# Patient Record
Sex: FEMALE | Race: WHITE | Employment: UNEMPLOYED | URBAN - METROPOLITAN AREA
[De-identification: names, ages, dates, MRNs, and addresses within clinical notes are randomized per-mention and may not be internally consistent; named-entity substitution may affect disease eponyms.]

---

## 2019-02-24 ENCOUNTER — APPOINTMENT (OUTPATIENT)
Dept: GENERAL RADIOLOGY | Age: 44
End: 2019-02-24
Payer: MEDICARE

## 2019-02-24 ENCOUNTER — HOSPITAL ENCOUNTER (EMERGENCY)
Age: 44
Discharge: HOME | End: 2019-02-25
Payer: MEDICARE

## 2019-02-24 DIAGNOSIS — T85.528A DISLODGED GASTROSTOMY TUBE: Primary | ICD-10-CM

## 2019-02-24 PROCEDURE — 6360000004 HC RX CONTRAST MEDICATION

## 2019-02-24 PROCEDURE — 99282 EMERGENCY DEPT VISIT SF MDM: CPT

## 2019-02-24 PROCEDURE — 49465 FLUORO EXAM OF G/COLON TUBE: CPT

## 2019-02-24 RX ORDER — ACETAMINOPHEN 500 MG
1000 TABLET ORAL ONCE
Status: COMPLETED | OUTPATIENT
Start: 2019-02-24 | End: 2019-02-25

## 2019-02-24 RX ORDER — SODIUM CHLORIDE 0.9 % (FLUSH) 0.9 %
SYRINGE (ML) INJECTION
Status: DISCONTINUED
Start: 2019-02-24 | End: 2019-02-25 | Stop reason: HOSPADM

## 2019-02-24 RX ADMIN — IOHEXOL 20 ML: 350 INJECTION, SOLUTION INTRAVENOUS at 23:10

## 2019-02-25 VITALS
BODY MASS INDEX: 21.48 KG/M2 | WEIGHT: 110 LBS | TEMPERATURE: 98.8 F | DIASTOLIC BLOOD PRESSURE: 89 MMHG | OXYGEN SATURATION: 99 % | SYSTOLIC BLOOD PRESSURE: 150 MMHG | RESPIRATION RATE: 16 BRPM | HEART RATE: 106 BPM

## 2019-02-25 PROCEDURE — 6370000000 HC RX 637 (ALT 250 FOR IP): Performed by: PHYSICIAN ASSISTANT

## 2019-02-25 RX ADMIN — ACETAMINOPHEN 1000 MG: 500 TABLET, FILM COATED ORAL at 00:06

## 2019-02-25 ASSESSMENT — PAIN SCALES - GENERAL: PAINLEVEL_OUTOF10: 8

## 2019-03-04 ENCOUNTER — HOSPITAL ENCOUNTER (EMERGENCY)
Age: 44
Discharge: HOME | End: 2019-03-05
Payer: MEDICARE

## 2019-03-04 ENCOUNTER — APPOINTMENT (OUTPATIENT)
Dept: GENERAL RADIOLOGY | Age: 44
End: 2019-03-04
Payer: MEDICARE

## 2019-03-04 VITALS
BODY MASS INDEX: 21.6 KG/M2 | TEMPERATURE: 98.1 F | SYSTOLIC BLOOD PRESSURE: 122 MMHG | WEIGHT: 110 LBS | DIASTOLIC BLOOD PRESSURE: 71 MMHG | HEART RATE: 108 BPM | RESPIRATION RATE: 22 BRPM | HEIGHT: 60 IN | OXYGEN SATURATION: 100 %

## 2019-03-04 DIAGNOSIS — K94.23 GASTROSTOMY TUBE DYSFUNCTION (HCC): Primary | ICD-10-CM

## 2019-03-04 PROCEDURE — 6360000004 HC RX CONTRAST MEDICATION

## 2019-03-04 PROCEDURE — 49465 FLUORO EXAM OF G/COLON TUBE: CPT

## 2019-03-04 PROCEDURE — 99283 EMERGENCY DEPT VISIT LOW MDM: CPT

## 2019-03-04 RX ORDER — SODIUM CHLORIDE 0.9 % (FLUSH) 0.9 %
SYRINGE (ML) INJECTION
Status: DISCONTINUED
Start: 2019-03-04 | End: 2019-03-05 | Stop reason: HOSPADM

## 2019-03-04 RX ADMIN — IOHEXOL 25 ML: 350 INJECTION, SOLUTION INTRAVENOUS at 22:33

## 2019-03-29 ENCOUNTER — HOSPITAL ENCOUNTER (EMERGENCY)
Age: 44
Discharge: HOME | End: 2019-03-29
Payer: MEDICARE

## 2019-03-29 VITALS
BODY MASS INDEX: 21.6 KG/M2 | TEMPERATURE: 98.3 F | OXYGEN SATURATION: 93 % | HEART RATE: 120 BPM | WEIGHT: 110 LBS | RESPIRATION RATE: 18 BRPM | DIASTOLIC BLOOD PRESSURE: 76 MMHG | SYSTOLIC BLOOD PRESSURE: 136 MMHG | HEIGHT: 60 IN

## 2019-03-29 DIAGNOSIS — Z43.1 ATTENTION TO G-TUBE (HCC): Primary | ICD-10-CM

## 2019-03-29 PROCEDURE — 99282 EMERGENCY DEPT VISIT SF MDM: CPT

## 2019-03-30 NOTE — ED NOTES
This RN flushed g-tube. g-tube draining and operating appropriately.       Chavo Biswas RN  03/29/19 2050

## 2019-03-30 NOTE — ED PROVIDER NOTES
[address] [facility]  eMERGENCY dEPARTMENT eNCOUnter        Pt Name: Josh Tran  MRN: [id number]  [place] 1975  Date of evaluation: 3/29/2019  Provider: Ethan Evans PA-C  PCP: Luis Carlos Wilburn MD    This patient was not seen and evaluated by the attending physician No att. providers found. CHIEF COMPLAINT       Chief Complaint   Patient presents with    G Tube Complications     sent to have gtube replaced. HISTORY OF PRESENT ILLNESS   (Location/Symptom, Timing/Onset, Context/Setting, Quality, Duration, Modifying Factors, Severity)  Note limiting factors. Josh Tran is a 40 y.o. female patient presents the emergency department to have her G-tube changed. Patient presents to the emergency department with her caregiver. Patient is nonverbal. Patient's caregiver states she is here to have the patient's G-tube changed as the particular style of G-tube that is currently in place irritates the patient. She states that this is a functioning G-tube and it is not currently clogged. The caregiver has a G-tube that she would like placed with her. There are no medical complaints at this time. Patient has not had any fevers or difficulties at home. Nursing Notes were all reviewed and agreed with or any disagreements were addressed  in the HPI. REVIEW OF SYSTEMS    (2-9 systems for level 4, 10 or more for level 5)     Review of Systems   Unable to perform ROS: Patient nonverbal       Positives and Pertinent negatives as per HPI. Except as noted abovein the ROS, all other systems were reviewed and negative.        PAST MEDICAL HISTORY     Past Medical History:   Diagnosis Date    ADHD (attention deficit hyperactivity disorder)     Anxiety     Dysphagia     1984    Feeding by G-tube ([address]     1984    Head injury     1984    Hydrocephalus     S/P SHUNT 1984    Hyperlipidemia     Hypothyroidism     Paraparesis ([address]     1984    [facility]     Seizure disorder ([facility]     Speech 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     46    Vitamin D deficiency          SURGICAL HISTORY     Past Surgical History:   Procedure Laterality Date    CSF SHUNT      GASTROSTOMY TUBE PLACEMENT           CURRENTMEDICATIONS       Previous Medications    ACETAMINOPHEN (TYLENOL) 500 MG TABLET    Take 500 mg by mouth every 6 hours as needed. ALBUTEROL (PROVENTIL HFA) 108 (90 BASE) MCG/ACT INHALER    Inhale 2 puffs into the lungs every 6 hours as needed for Wheezing    AMPHETAMINE-DEXTROAMPHETAMINE (ADDERALL XR) 20 MG XR CAPSULE    Take 1 capsule by mouth every morning    AMPHETAMINE-DEXTROAMPHETAMINE (ADDERALL XR) 5 MG XR CAPSULE    Take 1 capsule by mouth Daily with lunch    IMIQUIMOD (ALDARA) 5 % CREAM    Apply  topically three times a week. Apply topically three times a week. LEVOTHYROXINE (SYNTHROID) 25 MCG TABLET    Take 1 tablet by mouth daily    MENTHOL-ZINC OXIDE (CALMOSEPTINE) 0.44-20.6 % OINT OINTMENT    Apply topically 2 times daily as needed Max 30 ml per day. MOMETASONE (NASONEX) 50 MCG/ACT NASAL SPRAY    2 sprays by Nasal route daily    NUTRITIONAL SUPPLEMENTS (JEVITY 1 ATIF) LIQD    1 can TID    NYSTATIN (MYCOSTATIN) 131084 UNIT/GM CREAM    As directed daily    POLYETHYLENE GLYCOL (GLYCOLAX) POWDER    Take 17 g by mouth daily    TRIAMCINOLONE (ARISTOCORT) 0.5 % CREAM    As directed daily    VALPROIC ACID (DEPAKENE) 250 MG/5ML SYRP    Take 5 mLs by mouth 3 times daily         ALLERGIES     Phenytoin sodium extended    FAMILYHISTORY     History reviewed. No pertinent family history.        SOCIAL HISTORY       Social History     Socioeconomic History    Marital status: Single     Spouse name: None    Number of children: None    Years of education: None    Highest education level: None   Occupational History    None   Social Needs    Financial resource strain: None    Food insecurity:     Worry: None     Inability: None    specialist or surgeon. Patient was alert and appeared nontoxic on examination. Patient was afebrile and vitals are stable throughout her stay. FINAL IMPRESSION      1.  Attention to G-tube [facility])          DISPOSITION/PLAN   DISPOSITION Decision To Discharge 03/29/2019 08:37:20 PM      PATIENT REFERREDTO:  [facility] Emergency Department  [address] [place] [address]  [phone]    If symptoms worsen      DISCHARGE MEDICATIONS:  New Prescriptions    No medications on file       DISCONTINUED MEDICATIONS:  Discontinued Medications    No medications on file              (Please note that portions ofthis note were completed with a voice recognition program.  Efforts were made to edit the dictations but occasionally words are mis-transcribed.)    Yoni Dhaliwal PA-C (electronically signed)            Yoni Dhaliwal PA-C  03/30/19 7484
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
## 2019-05-23 ENCOUNTER — OFFICE VISIT (OUTPATIENT)
Dept: CARDIOLOGY CLINIC | Age: 44
End: 2019-05-23
Payer: MEDICARE

## 2019-05-23 VITALS
BODY MASS INDEX: 21.99 KG/M2 | SYSTOLIC BLOOD PRESSURE: 112 MMHG | HEART RATE: 111 BPM | WEIGHT: 112 LBS | HEIGHT: 60 IN | OXYGEN SATURATION: 98 % | DIASTOLIC BLOOD PRESSURE: 72 MMHG

## 2019-05-23 DIAGNOSIS — R00.0 SINUS TACHYCARDIA: ICD-10-CM

## 2019-05-23 DIAGNOSIS — Z76.89 ENCOUNTER TO ESTABLISH CARE WITH NEW DOCTOR: Primary | ICD-10-CM

## 2019-05-23 PROCEDURE — G8420 CALC BMI NORM PARAMETERS: HCPCS | Performed by: INTERNAL MEDICINE

## 2019-05-23 PROCEDURE — 99202 OFFICE O/P NEW SF 15 MIN: CPT | Performed by: INTERNAL MEDICINE

## 2019-05-23 PROCEDURE — G8427 DOCREV CUR MEDS BY ELIG CLIN: HCPCS | Performed by: INTERNAL MEDICINE

## 2019-05-23 PROCEDURE — 93000 ELECTROCARDIOGRAM COMPLETE: CPT | Performed by: INTERNAL MEDICINE

## 2019-05-23 PROCEDURE — 1036F TOBACCO NON-USER: CPT | Performed by: INTERNAL MEDICINE

## 2019-05-23 RX ORDER — ATENOLOL 25 MG/1
25 TABLET ORAL DAILY
Qty: 90 TABLET | Refills: 3 | Status: SHIPPED | OUTPATIENT
Start: 2019-05-23 | End: 2020-06-01

## 2019-05-23 RX ORDER — LORAZEPAM 0.5 MG/1
0.5 TABLET ORAL EVERY 6 HOURS PRN
Status: ON HOLD | COMMUNITY
End: 2020-08-01

## 2019-06-29 ENCOUNTER — HOSPITAL ENCOUNTER (EMERGENCY)
Age: 44
Discharge: HOME | End: 2019-06-30
Attending: EMERGENCY MEDICINE
Payer: MEDICARE

## 2019-06-29 VITALS
HEART RATE: 98 BPM | WEIGHT: 120 LBS | RESPIRATION RATE: 18 BRPM | DIASTOLIC BLOOD PRESSURE: 68 MMHG | SYSTOLIC BLOOD PRESSURE: 111 MMHG | TEMPERATURE: 98.8 F | BODY MASS INDEX: 23.44 KG/M2 | OXYGEN SATURATION: 94 %

## 2019-06-29 DIAGNOSIS — Z43.1 ATTENTION TO G-TUBE (HCC): Primary | ICD-10-CM

## 2019-06-29 PROCEDURE — 99282 EMERGENCY DEPT VISIT SF MDM: CPT

## 2019-06-29 RX ORDER — LIDOCAINE AND PRILOCAINE 25; 25 MG/G; MG/G
CREAM TOPICAL
Status: DISCONTINUED
Start: 2019-06-29 | End: 2019-06-30 | Stop reason: HOSPADM

## 2019-06-29 RX ORDER — LIDOCAINE HYDROCHLORIDE 20 MG/ML
JELLY TOPICAL
Status: DISCONTINUED
Start: 2019-06-29 | End: 2019-06-30 | Stop reason: HOSPADM

## 2019-06-29 RX ORDER — LIDOCAINE HYDROCHLORIDE 20 MG/ML
JELLY TOPICAL
Status: DISCONTINUED
Start: 2019-06-29 | End: 2019-06-29 | Stop reason: WASHOUT

## 2019-06-30 NOTE — ED NOTES
Pt's G tube site red, hardened skin, no drainage noted. Pt withdraws when tube manipulated. Able to instill 50ml water, unable to withdraw, this is typical per caregiver at bedside. Notified Dr. Stephania Bonds.      Moy Mclain RN  06/30/19 8403

## 2019-06-30 NOTE — ED NOTES
Pt used bedpan, obtained urine specimen, radha care with bath wipes, bed pad changed. Pt removed cycling blood pressure cuff, replaced at this time.       Iraida Carson RN  06/29/19 0551

## 2020-05-29 RX ORDER — ATENOLOL 25 MG/1
TABLET ORAL
Qty: 30 TABLET | Refills: 0 | OUTPATIENT
Start: 2020-05-29

## 2020-06-01 RX ORDER — ATENOLOL 25 MG/1
TABLET ORAL
Qty: 30 TABLET | Refills: 3 | Status: ON HOLD | OUTPATIENT
Start: 2020-06-01 | End: 2020-08-01

## 2020-06-01 NOTE — TELEPHONE ENCOUNTER
Last OV 5/23/19. Next OV not scheduled. Assessment and Plan:     Follow up with PCP     Thank you very much for allowing me to participate in the care of your patient.  Please do not hesitate to contact me if you have any questions.

## 2020-06-17 RX ORDER — ATENOLOL 25 MG/1
TABLET ORAL
Qty: 30 TABLET | Refills: 0 | OUTPATIENT
Start: 2020-06-17

## 2020-07-19 ENCOUNTER — HOSPITAL ENCOUNTER (EMERGENCY)
Age: 45
Discharge: HOME | End: 2020-07-19
Payer: MEDICARE

## 2020-07-19 ENCOUNTER — APPOINTMENT (OUTPATIENT)
Dept: GENERAL RADIOLOGY | Age: 45
End: 2020-07-19
Payer: MEDICARE

## 2020-07-19 VITALS
TEMPERATURE: 98.4 F | SYSTOLIC BLOOD PRESSURE: 115 MMHG | RESPIRATION RATE: 16 BRPM | HEART RATE: 98 BPM | OXYGEN SATURATION: 99 % | BODY MASS INDEX: 23.16 KG/M2 | WEIGHT: 118.6 LBS | DIASTOLIC BLOOD PRESSURE: 85 MMHG

## 2020-07-19 PROCEDURE — 6360000004 HC RX CONTRAST MEDICATION: Performed by: PHYSICIAN ASSISTANT

## 2020-07-19 PROCEDURE — 99282 EMERGENCY DEPT VISIT SF MDM: CPT

## 2020-07-19 PROCEDURE — 74018 RADEX ABDOMEN 1 VIEW: CPT

## 2020-07-19 PROCEDURE — 49450 REPLACE G/C TUBE PERC: CPT

## 2020-07-19 RX ADMIN — DIATRIZOATE MEGLUMINE AND DIATRIZOATE SODIUM 30 ML: 600; 100 SOLUTION ORAL; RECTAL at 19:19

## 2020-07-19 ASSESSMENT — ENCOUNTER SYMPTOMS
VOMITING: 0
ABDOMINAL PAIN: 0

## 2020-07-19 NOTE — ED PROVIDER NOTES
[address]        Pt Name: Yogesh Barros  MRN: [id number]  [place] 1975  Date of evaluation: 7/19/2020  Provider: ROSALINO Gonzales    This patient was not seen and evaluated by the attending physician No att. providers found. CHIEF COMPLAINT       Chief Complaint   Patient presents with    G Tube Complications         HISTORY OF PRESENT ILLNESS  (Location/Symptom, Timing/Onset, Context/Setting, Quality, Duration,Modifying Factors, Severity.)   Yogesh Barros is a 39 y.o. female who presents to the emergency department for G tube dislodgement. Patient's caregiver reports that G tube came out 1 hour ago. Reports when it did, the balloon was still inflated but not with much fluid. Reports she used a syringe to remove the fluid from the balloon and reports there was less fluid in there than normal.  She shows me the syringe she used that still has fluid in it and only 1 mL fluid. Caregiver reports she put the tube back in and taped it in place. She reports patient does not seem to be in pain. Denies vomiting. Did have a little bit of spit up after drinking her boost but that is normal for the patient. Denies any fever. Caregiver reports they don't use the G tube for feedings anymore. It is just flushed daily. Nursing Notes were reviewed and I agree. OF SYSTEMS    (2-9 systems for level 4, 10 or more for level 5)     Review of Systems   Constitutional: Negative for fever. Gastrointestinal: Negative for abdominal pain and vomiting.        +G tube dislodgement      Except as noted above the remainder of the review of systems was reviewed and negative.        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 ALBUTEROL (PROVENTIL HFA) 108 (90 BASE) MCG/ACT INHALER    Inhale 2 puffs into the lungs every 6 hours as needed for Wheezing    AMPHETAMINE-DEXTROAMPHETAMINE (ADDERALL XR) 20 MG XR CAPSULE    Take 1 capsule by mouth every morning    AMPHETAMINE-DEXTROAMPHETAMINE (ADDERALL XR) 5 MG XR CAPSULE    Take 1 capsule by mouth Daily with lunch    ATENOLOL (TENORMIN) 25 MG TABLET    TAKE 1 TABLET BY MOUTH ONCE DAILY. IMIQUIMOD (ALDARA) 5 % CREAM    Apply  topically three times a week. Apply topically three times a week. LEVOTHYROXINE (SYNTHROID) 25 MCG TABLET    Take 1 tablet by mouth daily    LORAZEPAM (ATIVAN) 0.5 MG TABLET    Take 0.5 mg by mouth every 6 hours as needed for Anxiety. MENTHOL-ZINC OXIDE (CALMOSEPTINE) 0.44-20.6 % OINT OINTMENT    Apply topically 2 times daily as needed Max 30 ml per day. MOMETASONE (NASONEX) 50 MCG/ACT NASAL SPRAY    2 sprays by Nasal route daily    NUTRITIONAL SUPPLEMENTS (JEVITY 1 ATIF) LIQD    1 can TID    NYSTATIN (MYCOSTATIN) 309020 UNIT/GM CREAM    As directed daily    POLYETHYLENE GLYCOL (GLYCOLAX) POWDER    Take 17 g by mouth daily    TRIAMCINOLONE (ARISTOCORT) 0.5 % CREAM    As directed daily    VALPROIC ACID (DEPAKENE) 250 MG/5ML SYRP    Take 5 mLs by mouth 3 times daily       ALLERGIES     Phenytoin sodium extended    FAMILY HISTORY     History reviewed. No pertinent family history. No family status information on file. SOCIAL HISTORY      reports that she has never smoked.  She has never used smokeless tobacco. She reports that she does not drink alcohol or use drugs. PHYSICAL EXAM    (up to 7 for level 4, 8 or more for level 5)     ED Triage Vitals [07/19/20 1804]   BP Temp Temp Source Pulse Resp SpO2 Height Weight   (!) 161/83 98.4 °F (36.9 °C) Oral 106 16 98 % -- 118 lb 9.6 oz (53.8 kg)       Physical Exam  Constitutional:       General: She is not in acute distress. Appearance: She is not ill-appearing, toxic-appearing or diaphoretic. HENT:      Head: Normocephalic and atraumatic. Pulmonary:      Effort: Pulmonary effort is normal. No respiratory distress. Abdominal:      General: There is no distension. Palpations: Abdomen is soft. There is no mass. Tenderness: There is no abdominal tenderness. There is no guarding or rebound. Hernia: No hernia is present. Comments: G tube site in LUQ with tube taped in place. Site has minimal surrounding erythema. No blood. Neurological:      Mental Status: She is alert. DIFFERENTIAL DIAGNOSIS   g tube complication, other    DIAGNOSTICRESULTS         RADIOLOGY:   Non-plain film images such as CT, Ultrasound and MRI are read by the radiologist. Plain radiographic images are visualized and preliminarily interpreted by ROSALINO Palmer with the below findings:      Interpretation per the Radiologist below, if available at the time of this note:    XR ABDOMEN (KUB) (SINGLE AP VIEW)   Final Result   Contrast injection demonstrates positioning within the stomach. LABS:  No results found for this visit on 07/19/20. All other labs were within normal range or not returned as of this dictation.     EMERGENCY DEPARTMENT COURSE and DIFFERENTIALDIAGNOSIS/MDM:   Vitals:    Vitals:    07/19/20 1804   BP: (!) 161/83   Pulse: 106   Resp: 16   Temp: 98.4 °F (36.9 °C)   TempSrc: Oral   SpO2: 98%   Weight: 118 lb 9.6 oz (53.8 kg)       Patient wasnontoxic, well appearing, afebrile with normal vital signs with the exception of HTN 161/83 and tachycardia 106. Saturating well on room air. G tube was placed. See note below. Confirmed with xray. Upon reeval, she clinically appears well. Instructed caregiver to FU with PCP and return for worsening. She agreed and understood. PROCEDURES:  Feeding Tube    Date/Time: 7/19/2020 6:46 PM  Performed by: ROSALINO Bloom  Authorized by: ROSALINO Bloom     Consent:     Consent obtained:  Verbal    Consent given by: caregiver. Pre-procedure details: Old tube type:  Gastrostomy    Old tube size:  18 Fr  Anesthesia (see MAR for exact dosages): Anesthesia method:  None  Procedure details:     Patient position:  Supine    Procedure type:  Replacement    Tube type:  Gastrostomy    Tube size:  18 Fr    Bulb inflation volume:  6 mL    Bulb inflation fluid:  Normal saline  Post-procedure details:     Placement/position confirmation:  Gastric contents aspirated and x-ray    Placement difficulty:  None    Bleeding:  None    Patient tolerance of procedure: Tolerated well, no immediate complications          FINAL IMPRESSION      1.  Complication of gastrostomy tube ([address]    2. Elevated blood pressure reading        DISPOSITION/PLAN   DISPOSITION Discharge - Pending Orders Complete 07/19/2020 07:48:45 PM      PATIENT REFERRED TO:  Jasmeet Ya, [address] [facility]  [phone]    Schedule an appointment as soon as possible for a visit   for reevaluation and to get blood pressure rechecked    [facility] Emergency Department  [address] [facility]  [phone]    As needed, If symptoms worsen      DISCHARGE MEDICATIONS:  New Prescriptions    No medications on file       (Please note that portions ofthis note were completed with a voice recognition program.  Efforts were made to edit the dictations but occasionally words are mis-transcribed.)    Sara Herrera, [address] [place] [address]  07/19/20

## 2020-07-21 ENCOUNTER — APPOINTMENT (OUTPATIENT)
Dept: GENERAL RADIOLOGY | Age: 45
End: 2020-07-21
Payer: MEDICARE

## 2020-07-21 ENCOUNTER — HOSPITAL ENCOUNTER (EMERGENCY)
Age: 45
Discharge: HOME | End: 2020-07-21
Payer: MEDICARE

## 2020-07-21 VITALS
TEMPERATURE: 97.7 F | DIASTOLIC BLOOD PRESSURE: 72 MMHG | SYSTOLIC BLOOD PRESSURE: 106 MMHG | RESPIRATION RATE: 16 BRPM | HEART RATE: 88 BPM | OXYGEN SATURATION: 96 %

## 2020-07-21 PROCEDURE — 74018 RADEX ABDOMEN 1 VIEW: CPT

## 2020-07-21 PROCEDURE — 6360000004 HC RX CONTRAST MEDICATION: Performed by: PHYSICIAN ASSISTANT

## 2020-07-21 PROCEDURE — 99282 EMERGENCY DEPT VISIT SF MDM: CPT

## 2020-07-21 RX ADMIN — DIATRIZOATE MEGLUMINE AND DIATRIZOATE SODIUM 20 ML: 600; 100 SOLUTION ORAL; RECTAL at 16:28

## 2020-07-21 ASSESSMENT — ENCOUNTER SYMPTOMS
VOMITING: 0
COLOR CHANGE: 1

## 2020-07-21 NOTE — ED NOTES
Patient presents to the ED via Wheelchair for leaking around G Tube site.   Patient presents with caregiver, patient follows basic commands, but is unable to answer questions verbally (will shake head appropriatly)       Shahida Ferreira RN  07/21/20 9582

## 2020-07-21 NOTE — ED NOTES
Bed: C-20  Expected date:   Expected time:   Means of arrival:   Comments:  Nina Ames RN  07/21/20 3088

## 2020-07-21 NOTE — ED PROVIDER NOTES
[address] [facility]      Pt Name: Garrett Rebolledo  MRN: [id number]  [place] 1975  Date of evaluation: 7/21/2020  Provider: ROSALINO Penaloza    This patient was not seen and evaluated by the attending physician No att. providers found. CHIEF COMPLAINT       Chief Complaint   Patient presents with    G Tube Complications     was replaced on Sunday night, now leaking around site per caregiver        CRITICAL CARE TIME   I performed a total Critical Care time of  15 minutes, excluding separately reportable procedures. There was a high probability of clinically significant/life threatening deterioration in the patient's condition which required my urgent intervention. Not limited to multiple reexaminations, discussions with attending physician and consultants. HISTORY OF PRESENT ILLNESS  (Location/Symptom, Timing/Onset, Context/Setting, Quality, Duration, Modifying Factors, Severity.)   Garrett Rebolledo is a 39 y.o. female who presents to the emergency department accompanied by a caretaker. Caretaker states that the patient has had a G-tube in place for many years. She states that she confirmed with the supervisor that the tube has not been used in over a year. She takes everything by mouth including medications and food and the caretakers \"flush it once a day. \"  The tube accidentally got pulled out 2 days ago and was replaced in the emergency department. They noticed today that there was some liquid leaking around the tube which prompted her to come back. No vomiting no fevers. She does not currently follow with a GI doctor per the caretaker. Nursing Notes were reviewed and I agree. REVIEW OF SYSTEMS    (2-9 systems for level 4, 10 or more for level 5)     Review of Systems   Constitutional: Negative for fever. Gastrointestinal: Negative for vomiting. Skin: Positive for color change (redness around g tube).  Negative for rash and wound.   Psychiatric/Behavioral: Positive for confusion (baseline mental status). PAST MEDICAL HISTORY         Diagnosis Date    ADHD (attention deficit hyperactivity disorder)     Anxiety     Dysphagia     1984    Feeding by G-tube ([facility]     1984    Head injury     1984    Hydrocephalus ([facility]     S/P SHUNT 1984    Hyperlipidemia     Hypothyroidism     Paraparesis ([facility]     1984    Seizure disorder ([facility]     Seizure disorder ([facility]     Speech 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     46    Vitamin D deficiency        SURGICAL HISTORY           Procedure Laterality Date    CSF SHUNT      GASTROSTOMY TUBE PLACEMENT         CURRENT MEDICATIONS       Discharge Medication List as of 7/21/2020  4:57 PM      CONTINUE these medications which have NOT CHANGED    Details   atenolol (TENORMIN) 25 MG tablet TAKE 1 TABLET BY MOUTH ONCE DAILY. , Disp-30 tablet, R-3FOR COMPLIANCE PACKAGING Patient to follow PCP. Please send future refills to PCP. Thank you. Normal      LORazepam (ATIVAN) 0.5 MG tablet Take 0.5 mg by mouth every 6 hours as needed for Anxiety. Historical Med      valproic acid (DEPAKENE) 250 MG/5ML SYRP Take 5 mLs by mouth 3 times daily, Disp-1350 mL, R-0**Patient requests 90 days supply**Normal      levothyroxine (SYNTHROID) 25 MCG tablet Take 1 tablet by mouth daily, Disp-90 tablet, R-0**Patient requests 90 days supply**Normal      menthol-zinc oxide (CALMOSEPTINE) 0.44-20.6 % OINT ointment Apply topically 2 times daily as needed Max 30 ml per day., Topical, 2 TIMES DAILY PRN Starting Tue 10/6/2015, Disp-71 g, R-0, Normal      albuterol (PROVENTIL HFA) 108 (90 BASE) MCG/ACT inhaler Inhale 2 puffs into the lungs every 6 hours as needed for Wheezing, Disp-1 Inhaler, R-1Normal      amphetamine-dextroamphetamine (ADDERALL XR) 5 MG XR capsule Take 1 capsule by mouth Daily with lunch, Disp-30 capsule, R-0NO PRINT      amphetamine-dextroamphetamine (ADDERALL XR) 20 MG XR capsule Take 1 capsule by mouth every morning, Disp-30 capsule, R-0NO PRINT      mometasone (NASONEX) 50 MCG/ACT nasal spray 2 sprays by Nasal route daily, Nasal, DAILY Starting Mon 7/6/2015, Disp-1 Inhaler, R-3, Normal      polyethylene glycol (GLYCOLAX) powder Take 17 g by mouth daily, Disp-1 Bottle, R-2Normal      triamcinolone (ARISTOCORT) 0.5 % cream As directed daily, Disp-1 Tube, R-0, Normal      Nutritional Supplements (JEVITY 1 ATIF) LIQD 1 can TID, Disp-90 Can, R-3Normal      nystatin (MYCOSTATIN) 024023 UNIT/GM cream As directed daily, Disp-1 Tube, R-1, Normal      acetaminophen (TYLENOL) 500 MG tablet Take 500 mg by mouth every 6 hours as needed. imiquimod (ALDARA) 5 % cream Apply  topically three times a week. Apply topically three times a week., Topical, Historical Med             ALLERGIES     Phenytoin sodium extended    FAMILY HISTORY     History reviewed. No pertinent family history. No family status information on file. SOCIAL HISTORY      reports that she has never smoked. She has never used smokeless tobacco. She reports that she does not drink alcohol or use drugs. PHYSICAL EXAM    (up to 7 for level 4, 8 or more for level 5)     ED Triage Vitals   BP Temp Temp src Pulse Resp SpO2 Height Weight   -- -- -- -- -- -- -- --     Physical Exam  Vitals signs and nursing note reviewed. Constitutional:       Appearance: Normal appearance. HENT:      Head: Normocephalic and atraumatic. Eyes:      Pupils: Pupils are equal, round, and reactive to light. Neck:      Musculoskeletal: Normal range of motion. Cardiovascular:      Rate and Rhythm: Normal rate. Pulmonary:      Effort: Pulmonary effort is normal. No respiratory distress. Abdominal:      Tenderness: There is no abdominal tenderness. There is no guarding. Musculoskeletal: Normal range of motion. Skin:     General: Skin is warm. Neurological:      Mental Status: She is alert.    Psychiatric:         Speech: She is noncommunicative. Cognition and Memory: Cognition is impaired. Memory is impaired. DIAGNOSTIC RESULTS     RADIOLOGY:   Non-plain film images such as CT, Ultrasound and MRI are read by the radiologist. Plain radiographic images are visualized and preliminarily interpreted by ROSALINO Parham with the below findings:    Reviewed radiologist's interpretation. Interpretation per the Radiologist below, if available at the time of this note:    XR ABDOMEN (KUB) (SINGLE AP VIEW)   Final Result   Gastrostomy tube injection. Contrast within the stomach. No definite   extravasation of contrast.               LABS:  Labs Reviewed - No data to display    All other labs were within normal range or not returned as of this dictation. EMERGENCY DEPARTMENT COURSE and DIFFERENTIAL DIAGNOSIS/MDM:   Vitals: There were no vitals filed for this visit. Patient is resting comfortably in the bed. She has no abdominal tenderness on exam.  She has a G-tube in place that flushes easily and is confirmed placement with x-ray. After discussing with caretaker and supervisor the patient's tube has not been used in a year. I advised to follow-up with GI to discuss risk-benefit in keeping the tube in place. The tube had a dressing placed around it to prevent irritation from reflux stomach contents around the tube. I estimate there is LOW risk for ACUTE APPENDICITIS, BOWEL OBSTRUCTION, CHOLECYSTITIS, DIVERTICULITIS, INCARCERATED HERNIA, PANCREATITIS, PERFORATED BOWEL, BOWEL ISCHEMIA, GONADAL TORSION, OR CARDIAC ISCHEMIA, thus I consider the discharge disposition reasonable. Also, there is no evidence or peritonitis, sepsis, or toxicity. CONSULTS:  None    PROCEDURES:  Procedures      FINAL IMPRESSION      1.  Gastrostomy tube in place [facility])          DISPOSITION/PLAN   DISPOSITION Decision To Discharge 07/21/2020 04:41:49 PM      PATIENT REFERRED TO:  Eleanor Elils MD  [address] [place] [address]  [phone]    Call in 1 day  For follow up with a GI Dr. Arbutus Fleischer:  Discharge Medication List as of 7/21/2020  4:57 PM          (Please note that portions of this note were completed with a voice recognition program.  Efforts were made to edit the dictations but occasionally words are mis-transcribed.)    Jose Robert [place]  07/21/20 6408

## 2020-07-21 NOTE — ED NOTES
Dressing applied to G Tube site LUQ abdomen, patient tolerated well. Site pink and dry.      Cris Block RN  07/21/20 1189

## 2020-07-31 ENCOUNTER — APPOINTMENT (OUTPATIENT)
Dept: CT IMAGING | Age: 45
DRG: 603 | End: 2020-07-31
Payer: MEDICARE

## 2020-07-31 ENCOUNTER — HOSPITAL ENCOUNTER (INPATIENT)
Age: 45
LOS: 3 days | Discharge: NURSING, REHAB OR LONG-TERM CARE | DRG: 603 | End: 2020-08-05
Attending: EMERGENCY MEDICINE | Admitting: INTERNAL MEDICINE
Payer: MEDICARE

## 2020-07-31 ENCOUNTER — APPOINTMENT (OUTPATIENT)
Dept: GENERAL RADIOLOGY | Age: 45
DRG: 603 | End: 2020-07-31
Payer: MEDICARE

## 2020-07-31 LAB
A/G RATIO: 1.3 (ref 1.1–2.2)
ALBUMIN SERPL-MCNC: 4.3 G/DL (ref 3.4–5)
ALP BLD-CCNC: 44 U/L (ref 40–129)
ALT SERPL-CCNC: 28 U/L (ref 10–40)
ANION GAP SERPL CALCULATED.3IONS-SCNC: 15 MMOL/L (ref 3–16)
AST SERPL-CCNC: 47 U/L (ref 15–37)
BASOPHILS ABSOLUTE: 0.1 K/UL (ref 0–0.2)
BASOPHILS RELATIVE PERCENT: 0.7 %
BILIRUB SERPL-MCNC: 0.3 MG/DL (ref 0–1)
BUN BLDV-MCNC: 27 MG/DL (ref 7–20)
CALCIUM SERPL-MCNC: 9.3 MG/DL (ref 8.3–10.6)
CHLORIDE BLD-SCNC: 105 MMOL/L (ref 99–110)
CO2: 20 MMOL/L (ref 21–32)
CREAT SERPL-MCNC: 1.2 MG/DL (ref 0.6–1.1)
EOSINOPHILS ABSOLUTE: 0 K/UL (ref 0–0.6)
EOSINOPHILS RELATIVE PERCENT: 0.3 %
GFR AFRICAN AMERICAN: 59
GFR NON-AFRICAN AMERICAN: 48
GLOBULIN: 3.2 G/DL
GLUCOSE BLD-MCNC: 86 MG/DL (ref 70–99)
HCT VFR BLD CALC: 41.6 % (ref 36–48)
HEMOGLOBIN: 14.2 G/DL (ref 12–16)
LACTIC ACID: 1.3 MMOL/L (ref 0.4–2)
LYMPHOCYTES ABSOLUTE: 2.2 K/UL (ref 1–5.1)
LYMPHOCYTES RELATIVE PERCENT: 28.7 %
MCH RBC QN AUTO: 31.5 PG (ref 26–34)
MCHC RBC AUTO-ENTMCNC: 34.2 G/DL (ref 31–36)
MCV RBC AUTO: 92.1 FL (ref 80–100)
MONOCYTES ABSOLUTE: 0.5 K/UL (ref 0–1.3)
MONOCYTES RELATIVE PERCENT: 6.8 %
NEUTROPHILS ABSOLUTE: 5 K/UL (ref 1.7–7.7)
NEUTROPHILS RELATIVE PERCENT: 63.5 %
PDW BLD-RTO: 13.3 % (ref 12.4–15.4)
PLATELET # BLD: 243 K/UL (ref 135–450)
PMV BLD AUTO: 9.6 FL (ref 5–10.5)
RBC # BLD: 4.52 M/UL (ref 4–5.2)
SODIUM BLD-SCNC: 140 MMOL/L (ref 136–145)
TOTAL PROTEIN: 7.5 G/DL (ref 6.4–8.2)
WBC # BLD: 7.8 K/UL (ref 4–11)

## 2020-07-31 PROCEDURE — 71045 X-RAY EXAM CHEST 1 VIEW: CPT

## 2020-07-31 PROCEDURE — 36415 COLL VENOUS BLD VENIPUNCTURE: CPT

## 2020-07-31 PROCEDURE — 85025 COMPLETE CBC W/AUTO DIFF WBC: CPT

## 2020-07-31 PROCEDURE — 6360000002 HC RX W HCPCS: Performed by: PHYSICIAN ASSISTANT

## 2020-07-31 PROCEDURE — 99285 EMERGENCY DEPT VISIT HI MDM: CPT

## 2020-07-31 PROCEDURE — 6360000004 HC RX CONTRAST MEDICATION: Performed by: PHYSICIAN ASSISTANT

## 2020-07-31 PROCEDURE — 80053 COMPREHEN METABOLIC PANEL: CPT

## 2020-07-31 PROCEDURE — 74177 CT ABD & PELVIS W/CONTRAST: CPT

## 2020-07-31 PROCEDURE — 83605 ASSAY OF LACTIC ACID: CPT

## 2020-07-31 PROCEDURE — 96375 TX/PRO/DX INJ NEW DRUG ADDON: CPT

## 2020-07-31 PROCEDURE — 96374 THER/PROPH/DIAG INJ IV PUSH: CPT

## 2020-07-31 RX ORDER — MORPHINE SULFATE 4 MG/ML
4 INJECTION, SOLUTION INTRAMUSCULAR; INTRAVENOUS ONCE
Status: COMPLETED | OUTPATIENT
Start: 2020-07-31 | End: 2020-07-31

## 2020-07-31 RX ORDER — ONDANSETRON 2 MG/ML
4 INJECTION INTRAMUSCULAR; INTRAVENOUS ONCE
Status: COMPLETED | OUTPATIENT
Start: 2020-07-31 | End: 2020-07-31

## 2020-07-31 RX ADMIN — MORPHINE SULFATE 4 MG: 4 INJECTION INTRAVENOUS at 21:25

## 2020-07-31 RX ADMIN — IOPAMIDOL 75 ML: 755 INJECTION, SOLUTION INTRAVENOUS at 23:07

## 2020-07-31 RX ADMIN — ONDANSETRON 4 MG: 2 INJECTION INTRAMUSCULAR; INTRAVENOUS at 21:26

## 2020-07-31 ASSESSMENT — ENCOUNTER SYMPTOMS
NAUSEA: 0
CHOKING: 0
VOMITING: 0
APNEA: 0
SHORTNESS OF BREATH: 0
ABDOMINAL PAIN: 1
BACK PAIN: 0
FACIAL SWELLING: 0
EYE DISCHARGE: 0
SORE THROAT: 0
EYE REDNESS: 0

## 2020-07-31 ASSESSMENT — PAIN DESCRIPTION - ORIENTATION
ORIENTATION: LEFT
ORIENTATION: LEFT;LOWER

## 2020-07-31 ASSESSMENT — PAIN SCALES - GENERAL
PAINLEVEL_OUTOF10: 9
PAINLEVEL_OUTOF10: 0

## 2020-07-31 ASSESSMENT — PAIN DESCRIPTION - DESCRIPTORS: DESCRIPTORS: BURNING

## 2020-07-31 ASSESSMENT — PAIN DESCRIPTION - LOCATION
LOCATION: ABDOMEN
LOCATION: ABDOMEN

## 2020-07-31 ASSESSMENT — PAIN DESCRIPTION - PAIN TYPE: TYPE: ACUTE PAIN

## 2020-08-01 PROBLEM — N17.9 AKI (ACUTE KIDNEY INJURY) (HCC): Status: ACTIVE | Noted: 2020-08-01

## 2020-08-01 PROBLEM — L03.311 CELLULITIS, ABDOMINAL WALL: Status: ACTIVE | Noted: 2020-08-01

## 2020-08-01 PROBLEM — L03.311 ABDOMINAL WALL CELLULITIS: Status: ACTIVE | Noted: 2020-08-01

## 2020-08-01 LAB
ANION GAP SERPL CALCULATED.3IONS-SCNC: 12 MMOL/L (ref 3–16)
BUN BLDV-MCNC: 23 MG/DL (ref 7–20)
CALCIUM SERPL-MCNC: 8.6 MG/DL (ref 8.3–10.6)
CHLORIDE BLD-SCNC: 107 MMOL/L (ref 99–110)
CO2: 21 MMOL/L (ref 21–32)
CREAT SERPL-MCNC: 1.2 MG/DL (ref 0.6–1.1)
GFR AFRICAN AMERICAN: 59
GFR NON-AFRICAN AMERICAN: 48
GLUCOSE BLD-MCNC: 86 MG/DL (ref 70–99)
POTASSIUM SERPL-SCNC: 4.3 MMOL/L (ref 3.5–5.1)
SODIUM BLD-SCNC: 140 MMOL/L (ref 136–145)

## 2020-08-01 PROCEDURE — 6360000002 HC RX W HCPCS: Performed by: EMERGENCY MEDICINE

## 2020-08-01 PROCEDURE — 6370000000 HC RX 637 (ALT 250 FOR IP): Performed by: INTERNAL MEDICINE

## 2020-08-01 PROCEDURE — 6360000002 HC RX W HCPCS: Performed by: INTERNAL MEDICINE

## 2020-08-01 PROCEDURE — G0378 HOSPITAL OBSERVATION PER HR: HCPCS

## 2020-08-01 PROCEDURE — 2580000003 HC RX 258: Performed by: PHYSICIAN ASSISTANT

## 2020-08-01 PROCEDURE — 2580000003 HC RX 258: Performed by: INTERNAL MEDICINE

## 2020-08-01 PROCEDURE — 2580000003 HC RX 258: Performed by: STUDENT IN AN ORGANIZED HEALTH CARE EDUCATION/TRAINING PROGRAM

## 2020-08-01 PROCEDURE — 96366 THER/PROPH/DIAG IV INF ADDON: CPT

## 2020-08-01 PROCEDURE — 94761 N-INVAS EAR/PLS OXIMETRY MLT: CPT

## 2020-08-01 PROCEDURE — 96367 TX/PROPH/DG ADDL SEQ IV INF: CPT

## 2020-08-01 PROCEDURE — 36415 COLL VENOUS BLD VENIPUNCTURE: CPT

## 2020-08-01 PROCEDURE — 96376 TX/PRO/DX INJ SAME DRUG ADON: CPT

## 2020-08-01 PROCEDURE — 80048 BASIC METABOLIC PNL TOTAL CA: CPT

## 2020-08-01 PROCEDURE — 2580000003 HC RX 258: Performed by: EMERGENCY MEDICINE

## 2020-08-01 PROCEDURE — 96365 THER/PROPH/DIAG IV INF INIT: CPT

## 2020-08-01 RX ORDER — DEXTROAMPHETAMINE SACCHARATE, AMPHETAMINE ASPARTATE MONOHYDRATE, DEXTROAMPHETAMINE SULFATE AND AMPHETAMINE SULFATE 2.5; 2.5; 2.5; 2.5 MG/1; MG/1; MG/1; MG/1
20 CAPSULE, EXTENDED RELEASE ORAL EVERY MORNING
Status: DISCONTINUED | OUTPATIENT
Start: 2020-08-02 | End: 2020-08-05 | Stop reason: HOSPADM

## 2020-08-01 RX ORDER — VALPROIC ACID 250 MG/5ML
250 SOLUTION ORAL 3 TIMES DAILY
Status: DISCONTINUED | OUTPATIENT
Start: 2020-08-01 | End: 2020-08-05 | Stop reason: HOSPADM

## 2020-08-01 RX ORDER — CETIRIZINE HYDROCHLORIDE 10 MG/1
5 TABLET ORAL DAILY
Status: DISCONTINUED | OUTPATIENT
Start: 2020-08-01 | End: 2020-08-05 | Stop reason: HOSPADM

## 2020-08-01 RX ORDER — TRAZODONE HYDROCHLORIDE 50 MG/1
50 TABLET ORAL NIGHTLY
Status: DISCONTINUED | OUTPATIENT
Start: 2020-08-01 | End: 2020-08-05 | Stop reason: HOSPADM

## 2020-08-01 RX ORDER — SODIUM CHLORIDE 0.9 % (FLUSH) 0.9 %
10 SYRINGE (ML) INJECTION PRN
Status: DISCONTINUED | OUTPATIENT
Start: 2020-08-01 | End: 2020-08-05 | Stop reason: HOSPADM

## 2020-08-01 RX ORDER — LEVOTHYROXINE SODIUM 0.03 MG/1
25 TABLET ORAL DAILY
Status: DISCONTINUED | OUTPATIENT
Start: 2020-08-01 | End: 2020-08-05 | Stop reason: HOSPADM

## 2020-08-01 RX ORDER — MEDROXYPROGESTERONE ACETATE 150 MG/ML
150 INJECTION, SUSPENSION INTRAMUSCULAR
Status: ON HOLD | COMMUNITY
End: 2020-08-01

## 2020-08-01 RX ORDER — SODIUM CHLORIDE 0.9 % (FLUSH) 0.9 %
10 SYRINGE (ML) INJECTION EVERY 12 HOURS SCHEDULED
Status: DISCONTINUED | OUTPATIENT
Start: 2020-08-01 | End: 2020-08-05 | Stop reason: HOSPADM

## 2020-08-01 RX ORDER — ONDANSETRON 2 MG/ML
4 INJECTION INTRAMUSCULAR; INTRAVENOUS EVERY 6 HOURS PRN
Status: DISCONTINUED | OUTPATIENT
Start: 2020-08-01 | End: 2020-08-05 | Stop reason: HOSPADM

## 2020-08-01 RX ORDER — 0.9 % SODIUM CHLORIDE 0.9 %
1000 INTRAVENOUS SOLUTION INTRAVENOUS ONCE
Status: COMPLETED | OUTPATIENT
Start: 2020-08-01 | End: 2020-08-01

## 2020-08-01 RX ORDER — SULFAMETHOXAZOLE AND TRIMETHOPRIM 800; 160 MG/1; MG/1
1 TABLET ORAL 2 TIMES DAILY
Status: ON HOLD | COMMUNITY
Start: 2020-07-28 | End: 2020-08-01

## 2020-08-01 RX ORDER — SODIUM CHLORIDE 9 MG/ML
INJECTION, SOLUTION INTRAVENOUS CONTINUOUS
Status: DISCONTINUED | OUTPATIENT
Start: 2020-08-01 | End: 2020-08-05 | Stop reason: HOSPADM

## 2020-08-01 RX ORDER — TRAZODONE HYDROCHLORIDE 50 MG/1
50 TABLET ORAL NIGHTLY
COMMUNITY

## 2020-08-01 RX ORDER — MORPHINE SULFATE 2 MG/ML
1 INJECTION, SOLUTION INTRAMUSCULAR; INTRAVENOUS EVERY 4 HOURS PRN
Status: DISCONTINUED | OUTPATIENT
Start: 2020-08-01 | End: 2020-08-05 | Stop reason: HOSPADM

## 2020-08-01 RX ORDER — LORATADINE 10 MG/1
10 TABLET ORAL DAILY
COMMUNITY

## 2020-08-01 RX ORDER — NYSTATIN 100000 U/G
CREAM TOPICAL 2 TIMES DAILY
Status: DISCONTINUED | OUTPATIENT
Start: 2020-08-01 | End: 2020-08-05 | Stop reason: HOSPADM

## 2020-08-01 RX ORDER — DEXTROAMPHETAMINE SACCHARATE, AMPHETAMINE ASPARTATE MONOHYDRATE, DEXTROAMPHETAMINE SULFATE AND AMPHETAMINE SULFATE 1.25; 1.25; 1.25; 1.25 MG/1; MG/1; MG/1; MG/1
5 CAPSULE, EXTENDED RELEASE ORAL
Status: DISCONTINUED | OUTPATIENT
Start: 2020-08-01 | End: 2020-08-01

## 2020-08-01 RX ORDER — POLYETHYLENE GLYCOL 3350 17 G/17G
17 POWDER, FOR SOLUTION ORAL DAILY
Status: DISCONTINUED | OUTPATIENT
Start: 2020-08-02 | End: 2020-08-05 | Stop reason: HOSPADM

## 2020-08-01 RX ORDER — POLYETHYLENE GLYCOL 3350 17 G/17G
17 POWDER, FOR SOLUTION ORAL DAILY
Status: DISCONTINUED | OUTPATIENT
Start: 2020-08-01 | End: 2020-08-01

## 2020-08-01 RX ADMIN — MORPHINE SULFATE 1 MG: 2 INJECTION, SOLUTION INTRAMUSCULAR; INTRAVENOUS at 13:05

## 2020-08-01 RX ADMIN — AMPICILLIN SODIUM AND SULBACTAM SODIUM 1.5 G: 1; .5 INJECTION, POWDER, FOR SOLUTION INTRAMUSCULAR; INTRAVENOUS at 20:21

## 2020-08-01 RX ADMIN — POLYETHYLENE GLYCOL 3350 17 G: 17 POWDER, FOR SOLUTION ORAL at 17:27

## 2020-08-01 RX ADMIN — SODIUM CHLORIDE 3 G: 900 INJECTION INTRAVENOUS at 05:04

## 2020-08-01 RX ADMIN — SODIUM CHLORIDE 1000 ML: 9 INJECTION, SOLUTION INTRAVENOUS at 01:07

## 2020-08-01 RX ADMIN — MORPHINE SULFATE 1 MG: 2 INJECTION, SOLUTION INTRAMUSCULAR; INTRAVENOUS at 17:13

## 2020-08-01 RX ADMIN — CETIRIZINE HYDROCHLORIDE 5 MG: 10 TABLET, FILM COATED ORAL at 17:13

## 2020-08-01 RX ADMIN — VALPROIC ACID 250 MG: 250 SOLUTION ORAL at 20:21

## 2020-08-01 RX ADMIN — Medication 10 ML: at 20:25

## 2020-08-01 RX ADMIN — VALPROIC ACID 250 MG: 250 SOLUTION ORAL at 17:13

## 2020-08-01 RX ADMIN — VANCOMYCIN HYDROCHLORIDE 750 MG: 750 INJECTION, POWDER, LYOPHILIZED, FOR SOLUTION INTRAVENOUS at 15:42

## 2020-08-01 RX ADMIN — SODIUM CHLORIDE: 9 INJECTION, SOLUTION INTRAVENOUS at 17:30

## 2020-08-01 RX ADMIN — MORPHINE SULFATE 1 MG: 2 INJECTION, SOLUTION INTRAMUSCULAR; INTRAVENOUS at 21:01

## 2020-08-01 RX ADMIN — LEVOTHYROXINE SODIUM 25 MCG: 0.03 TABLET ORAL at 17:13

## 2020-08-01 RX ADMIN — AMPICILLIN SODIUM AND SULBACTAM SODIUM 1.5 G: 1; .5 INJECTION, POWDER, FOR SOLUTION INTRAMUSCULAR; INTRAVENOUS at 14:53

## 2020-08-01 RX ADMIN — TRAZODONE HYDROCHLORIDE 50 MG: 50 TABLET ORAL at 20:21

## 2020-08-01 ASSESSMENT — PAIN SCALES - GENERAL
PAINLEVEL_OUTOF10: 4
PAINLEVEL_OUTOF10: 0
PAINLEVEL_OUTOF10: 0
PAINLEVEL_OUTOF10: 3
PAINLEVEL_OUTOF10: 5
PAINLEVEL_OUTOF10: 4
PAINLEVEL_OUTOF10: 7
PAINLEVEL_OUTOF10: 0

## 2020-08-01 ASSESSMENT — PAIN SCALES - WONG BAKER: WONGBAKER_NUMERICALRESPONSE: 0

## 2020-08-01 NOTE — ED PROVIDER NOTES
**EVALUATED BY ADVANCED PRACTICE PROVIDER**        [facility] MED SUR[facility]  EMERGENCY DEPARTMENT ENCOUNTER      Pt Name: Sharifa Moreno  HQW:[id number]  [place] 1975  Date of evaluation: 7/31/2020  Provider: Nirmala Reyes PA-C      Chief Complaint:    Chief Complaint   Patient presents with    Other     J tube taken out Tuesday - bleeding from site unable to stop        Nursing Notes, Past Medical Hx, Past Surgical Hx, Social Hx, Allergies, and Family Hx were all reviewed and agreed with or any disagreements were addressed in the HPI.    HPI:  (Location, Duration, Timing, Severity, Quality, Assoc Sx, Context, Modifying factors)  This is a  39 y.o. female complaint of abdominal pain. Patient recently had a J-tube removed. Area around the J-tube skin now appears to be red and very painful. Abdomen painful. Denies nausea vomiting. Denies passing black tarry stools. No diarrhea or constipation. Denies chest pain, no fever. Patient appears to be very uncomfortable. No back pain. No other complaints.       PastMedical/Surgical History:      Diagnosis Date    ADHD (attention deficit hyperactivity disorder)     MARIA TERESA (acute kidney injury) ([facility] 8/1/2020    Anxiety     Dysphagia     1984    Feeding by G-tube ([facility]     1984    Head injury     1984    Hydrocephalus ([facility]     S/P SHUNT 1984    Hyperlipidemia     Hypothyroidism     Paraparesis ([facility]     1984    Seizure disorder ([facility]     Seizure disorder ([facility]     Speech 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     46    Vitamin D deficiency          Procedure Laterality Date    CSF SHUNT      GASTROSTOMY TUBE PLACEMENT         Medications:  Current Discharge Medication List      CONTINUE these medications which have NOT CHANGED    Details   loratadine (CLARITIN) 10 MG tablet Take 10 mg by mouth daily      traZODone (DESYREL) 50 MG tablet Take 50 mg by mouth nightly      sulfamethoxazole-trimethoprim (BACTRIM DS;SEPTRA DS) 800-160 MG per tablet Take 1 tablet by mouth 2 times daily      medroxyPROGESTERone (DEPO-PROVERA) 150 MG/ML injection Inject 150 mg into the muscle every 3 months      atenolol (TENORMIN) 25 MG tablet TAKE 1 TABLET BY MOUTH ONCE DAILY. Qty: 30 tablet, Refills: 3    Comments: FOR COMPLIANCE PACKAGING  Patient to follow PCP. Please send future refills to PCP. Thank you. valproic acid (DEPAKENE) 250 MG/5ML SYRP Take 5 mLs by mouth 3 times daily  Qty: 1350 mL, Refills: 0    Comments: **Patient requests 90 days supply**      levothyroxine (SYNTHROID) 25 MCG tablet Take 1 tablet by mouth daily  Qty: 90 tablet, Refills: 0    Comments: **Patient requests 90 days supply**      menthol-zinc oxide (CALMOSEPTINE) 0.44-20.6 % OINT ointment Apply topically 2 times daily as needed Max 30 ml per day. Qty: 71 g, Refills: 0      amphetamine-dextroamphetamine (ADDERALL XR) 5 MG XR capsule Take 1 capsule by mouth Daily with lunch  Qty: 30 capsule, Refills: 0      amphetamine-dextroamphetamine (ADDERALL XR) 20 MG XR capsule Take 1 capsule by mouth every morning  Qty: 30 capsule, Refills: 0      polyethylene glycol (GLYCOLAX) powder Take 17 g by mouth daily  Qty: 1 Bottle, Refills: 2    Associated Diagnoses: Feeding by G-tube ([facility])      triamcinolone (ARISTOCORT) 0.5 % cream As directed daily  Qty: 1 Tube, Refills: 0      nystatin (MYCOSTATIN) 181732 UNIT/GM cream As directed daily  Qty: 1 Tube, Refills: 1    Associated Diagnoses: Feeding by G-tube ([facility]               Review of Systems:  Review of Systems   Constitutional: Negative for chills and fever. HENT: Negative for congestion, facial swelling and sore throat. Eyes: Negative for discharge and redness. Respiratory: Negative for apnea, choking and shortness of breath. Cardiovascular: Negative for chest pain. Gastrointestinal: Positive for abdominal pain. Negative for nausea and vomiting. Genitourinary: Negative for dysuria.    Musculoskeletal: Negative for back pain, neck pain and neck stiffness. Neurological: Negative for dizziness, tremors, seizures, weakness and headaches. All other systems reviewed and are negative. Positives and Pertinent negatives as per HPI. Except as noted above in the ROS, problem specific ROS was completed and is negative. Physical Exam:  Physical Exam  Vitals signs and nursing note reviewed. Constitutional:       Appearance: She is well-developed. She is not diaphoretic. HENT:      Head: Normocephalic and atraumatic. Nose: Nose normal.   Eyes:      General:         Right eye: No discharge. Left eye: No discharge. Neck:      Musculoskeletal: Normal range of motion and neck supple. Cardiovascular:      Rate and Rhythm: Normal rate and regular rhythm. Heart sounds: Normal heart sounds. No murmur. No friction rub. No gallop. Pulmonary:      Effort: Pulmonary effort is normal. No respiratory distress. Breath sounds: Normal breath sounds. No wheezing or rales. Chest:      Chest wall: No tenderness. Musculoskeletal: Normal range of motion. Skin:     General: Skin is warm and dry. Neurological:      Mental Status: She is alert and oriented to person, place, and time.    Psychiatric:         Behavior: Behavior normal.         MEDICAL DECISION MAKING    Vitals:    Vitals:    08/01/20 0558 08/01/20 0745 08/01/20 0838 08/01/20 1441   BP: 94/69   119/62   Pulse:    110   Resp: 16   16   Temp: 97.8 °F (36.6 °C)   98.6 °F (37 °C)   TempSrc: Oral   Oral   SpO2:   92% 90%   Weight:  112 lb 10.5 oz (51.1 kg)     Height:           LABS:  Labs Reviewed   COMPREHENSIVE METABOLIC PANEL - Abnormal; Notable for the following components:       Result Value    CO2 20 (*)     BUN 27 (*)     CREATININE 1.2 (*)     GFR Non- 48 (*)     GFR  59 (*)     AST 47 (*)     All other components within normal limits    Narrative:     Performed at:  [facility]  [address] [place] [address] [place]on, [place] [address]   Phone [phone]   BASIC METABOLIC PANEL - Abnormal; Notable for the following components:    BUN 23 (*)     CREATININE 1.2 (*)     GFR Non- 48 (*)     GFR  61 (*)     All other components within normal limits    Narrative:     Performed at:  [facility]  [address] [place], [place] [address]   Phone [phone]   CBC WITH AUTO DIFFERENTIAL    Narrative:     Performed at:  [facility]  [address] [place], [place] [address]   Phone [phone]   LACTIC ACID, PLASMA    Narrative:     Performed at:  [facility]  [address] [place], [place] [address]   Phone [phone]   URINE RT REFLEX TO CULTURE        Remainder of labs reviewed and werenegative at this time or not returned at the time of this note. RADIOLOGY:   Non-plain film images such as CT, Ultrasound and MRI are read by the radiologist. Nelson Andrew PA-C have directly visualized the radiologic plain film image(s) with the below findings:        Interpretation per the Radiologist below, if available at the time of this note:    CT ABDOMEN PELVIS W IV CONTRAST Additional Contrast? IV   Final Result   No definite acute abnormality identified. There is a small amount of perisplenic fluid which may relate to the   ventriculoperitoneal shunt (the ventriculoperitoneal shunt terminates in the   left upper abdomen). No evidence of small bowel obstruction. XR CHEST PORTABLE   Final Result   Unremarkable portable chest radiograph.               Xr Abdomen (kub) (single Ap View)    Result Date: 7/21/2020  EXAMINATION: ONE SUPINE XRAY VIEW(S) OF THE ABDOMEN 7/21/2020 4:27 pm COMPARISON: July 19, 2020 HISTORY: ORDERING SYSTEM PROVIDED HISTORY: g tube confirmation TECHNOLOGIST PROVIDED HISTORY: Reason for exam:->g tube confirmation Reason for Exam: g tube sodium chloride flush 0.9 % injection 10 mL (has no administration in time range)   sodium chloride flush 0.9 % injection 10 mL (has no administration in time range)   ondansetron (ZOFRAN) injection 4 mg (has no administration in time range)   morphine (PF) injection 1 mg (1 mg Intravenous Given 8/1/20 1305)   ampicillin-sulbactam (UNASYN) 1.5 g IVPB minibag (0 g Intravenous Stopped 8/1/20 1540)   vancomycin (VANCOCIN) intermittent dosing (placeholder) ( Other Canceled Entry 8/1/20 1500)   vancomycin (VANCOCIN) 750 mg in dextrose 5 % 250 mL IVPB (750 mg Intravenous New Bag 8/1/20 1542)   morphine (PF) injection 4 mg (4 mg Intravenous Given 7/31/20 2125)   ondansetron (ZOFRAN) injection 4 mg (4 mg Intravenous Given 7/31/20 2126)   iopamidol (ISOVUE-370) 76 % injection 75 mL (75 mLs Intravenous Given 7/31/20 2307)   0.9 % sodium chloride bolus (0 mLs Intravenous Stopped 8/1/20 0207)   ampicillin-sulbactam (UNASYN) 3 g ivpb minibag (0 g Intravenous Stopped 8/1/20 0412)       Emergency room course: Patient on exam cardiovascular regular rhythm, lungs are clear. No wheeze, rales or rhonchi. No chest wall tenderness palpation. Abdomen is normal bowel sounds with mild tenderness with palpation. Area of redness around the opening where she has had her G-tube removed. Some drainage. No rebound or guarding noted. Bilateral lower extremities show no edema. She is alert oriented x4. Does not appear to be in acute distress. She is nonverbal.    Lab result from today shows:  CBC within normal limits with a white count of 7.8. CMP shows chloride 105. BUN 27 creatinine 1.2. Lactic acid 1.3    CT abdomen and pelvis with IV contrast shows no acute abnormalities identified. There is a small amount of perisplenic fluid which may relate to the ventricular peritoneal shunt which terminates in the left upper abdomen. No evidence of small bowel obstruction. I did have my attending Dr. Leong Thiago see this patient with me. Because of her creatinine being 1.2 and her normal is 0.5 she thought this patient should be admitted. We tried to give her a liter normal saline to see if this will change her she could be a little dehydrated. And results shows:    BMP sodium 140, potassium 4.3, chloride 107 BUN 23 creatinine 1.2. Therefore there was no change. Sister has been greater than a 30% increase in her creatinine she wants her to be admitted for acute kidney injury. Because of the redness around her gastrotomy tube removal area for cellulitis as well. Consult be made to hospitalist.  Patient will be admitted in stable condition. The patient tolerated their visit well. I evaluated the patient. The physician was available for consultation as needed. The patient and / or the family were informed of the results of any tests, a time was given to answer questions, a plan was proposed and they agreed with plan. CLINICAL IMPRESSION:  1. MARIA TERESA (acute kidney injury) ([facility]    2. Cellulitis, unspecified cellulitis site    3.  Complication of gastrostomy tube ([facility]        DISPOSITION Admitted 08/01/2020 04:16:45 AM      PATIENT REFERRED TO:  Melvi Linder MD  [address] [phone]            DISCHARGE MEDICATIONS:  Current Discharge Medication List          DISCONTINUED MEDICATIONS:  Current Discharge Medication List      STOP taking these medications       LORazepam (ATIVAN) 0.5 MG tablet Comments:   Reason for Stopping:         albuterol (PROVENTIL HFA) 108 (90 BASE) MCG/ACT inhaler Comments:   Reason for Stopping:         mometasone (NASONEX) 50 MCG/ACT nasal spray Comments:   Reason for Stopping:         Nutritional Supplements (JEVITY 1 ATIF) LIQD Comments:   Reason for Stopping:         acetaminophen (TYLENOL) 500 MG tablet Comments:   Reason for Stopping:         imiquimod (ALDARA) 5 % cream Comments:   Reason for Stopping:                      (Please note the MDM and HPI sections of this note were completed with a voice recognition program.  Efforts were made to edit the dictations but occasionally words are mis-transcribed.)    Electronically signed, Elizabeth Lee PA-C,           Elizabeth Lee PA-C  08/01/20 1600

## 2020-08-01 NOTE — ED NOTES
Per MD Rand Bland blood cultures are not needed at this time.       Shasha Higgins RN  08/01/20 6997

## 2020-08-01 NOTE — H&P
Hospital Medicine History & Physical      PCP: Melvi Linder MD    Date of Admission: 7/31/2020    Chief Complaint: Abdominal pain, redness around area of previous J-tube    History Of Present Illness:  Patient is a 63-year-old female from assisted living facility has past medical history of speech and language deficit secondary to CVA, hypothyroidism, hyperlipidemia, ADHD who presented to hospital for abdominal pain. Patient is a very poor historian and has a speech deficit, most of the history is from patient's chart, ED nursing notes. Patient recently had G-tube removed, surrounding area of the J-tube appeared to be painful and red. Patient nods her head to abdominal pain and has been asking for pain medication. Patient seems to be very uncomfortable in bed. Patient denies fever. 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  Authorizing Provider   loratadine (CLARITIN) 10 MG tablet Take 10 mg by mouth daily   Yes Historical Provider, MD   traZODone (DESYREL) 50 MG tablet Take 50 mg by mouth nightly   Yes Historical Provider, MD   valproic acid (DEPAKENE) 250 MG/5ML SYRP Take 5 mLs by mouth 3 times daily  Patient taking differently: Take 250 mg by mouth 2 times daily  10/6/15  Yes Melvi Linder MD levothyroxine (SYNTHROID) 25 MCG tablet Take 1 tablet by mouth daily 10/6/15  Yes Demarcus Cuellar MD   menthol-zinc oxide (CALMOSEPTINE) 0.44-20.6 % OINT ointment Apply topically 2 times daily as needed Max 30 ml per day. 10/6/15  Yes Demarcus Cuellar MD   amphetamine-dextroamphetamine (ADDERALL XR) 5 MG XR capsule Take 1 capsule by mouth Daily with lunch 7/6/15  Yes Demarcus Cuellar MD   amphetamine-dextroamphetamine (ADDERALL XR) 20 MG XR capsule Take 1 capsule by mouth every morning 7/6/15 8/1/20 Yes Demarcus Cuellar MD   polyethylene glycol [facility]) powder Take 17 g by mouth daily 7/6/15  Yes Demarcus Cuellar MD   nystatin (MYCOSTATIN) 828039 UNIT/GM cream As directed daily  Patient taking differently: As directed BID; G tube site, any areas of redness 11/10/14  Yes Demarcus Cuellar MD       Allergies:  Phenytoin sodium extended    Social History:      TOBACCO:   reports that she has never smoked. She has never used smokeless tobacco.  ETOH:   reports no history of alcohol use. Family History:       Reviewed in detail and non contributory      No family history on file. REVIEW OF SYSTEMS:   Pertinent positives as noted in the HPI. All other systems reviewed and negative. PHYSICAL EXAM PERFORMED:    /62   Pulse 110   Temp 98.6 °F (37 °C) (Oral)   Resp 16   Ht 5' (1.524 m)   Wt 112 lb 10.5 oz (51.1 kg)   SpO2 90%   BMI 22.00 kg/m²     General appearance: Patient seems uncomfortable in bed, moving often in bed  HEENT:  Normal cephalic, atraumatic without obvious deformity. Conjunctivae/corneas clear. Neck: Supple, with full range of motion. No cervical lymphadenopathy  Respiratory:  Normal respiratory effort. Clear to auscultation, bilaterally without Rales/Wheezes/Rhonchi. Cardiovascular:  Regular rate and rhythm with normal S1/S2 without murmurs, rubs or gallops.   Abdomen: Tenderness on palpation of anterior abdominal wall, anterior abdominal wall covered in dressing  Musculoskeletal:  No edema

## 2020-08-01 NOTE — PROGRESS NOTES
Medication Reconciliation    List of medications patient is currently taking is complete. Source of information: 1. [facility]                                      2. EPIC records                                       3. RN's conversation with Caregiver     Allergies  Phenytoin sodium extended     Notes regarding home medications:   1. Patient was started on Bactrim DS BID x 14 days on 07/28/20.  Added to list.  2. Patient no longer taking lorazepam. Removed from list.

## 2020-08-01 NOTE — ED NOTES
Bed: C-20  Expected date:   Expected time:   Means of arrival:   Comments:  40 y/o other in desiree Nicolas RN  07/31/20 2014

## 2020-08-01 NOTE — PROGRESS NOTES
Spoke with Dr. Noah Mojica regarding Urine culture ordered 7-31. Informed doctor that patient is incontinent and unable to inform staff before voiding. Straight cath suggested for sample collection. Doctor states he will hold off on urine culture. Will continue to monitor.  Electronically signed by Cruz Pickering RN on 8/1/2020 at 3:14 PM

## 2020-08-01 NOTE — PLAN OF CARE
Problem: Skin Integrity:  Goal: Will show no infection signs and symptoms  Description: Will show no infection signs and symptoms  8/1/2020 0838 by Diane Easton RN  Outcome: Ongoing  8/1/2020 0710 by India Williamson  Outcome: Ongoing  Goal: Absence of new skin breakdown  Description: Absence of new skin breakdown  8/1/2020 0838 by Diane Easton RN  Outcome: Ongoing  8/1/2020 0710 by India Williamson  Outcome: Ongoing     Problem: Falls - Risk of:  Goal: Will remain free from falls  Description: Will remain free from falls  8/1/2020 0838 by Diane Easton RN  Outcome: Ongoing  8/1/2020 0710 by India Williamson  Outcome: Ongoing  Goal: Absence of physical injury  Description: Absence of physical injury  8/1/2020 0838 by Diane Easton RN  Outcome: Ongoing  8/1/2020 0710 by India Williamson  Outcome: Ongoing

## 2020-08-01 NOTE — ED NOTES
Report called to [facility]. Handoff of pts pain score and plan of care.       Negar Cummins RN  08/01/20 [id number]

## 2020-08-01 NOTE — ED PROVIDER NOTES
[facility]      Pt Name: Sulma Bravo  MRN: [id number]  [place] 1975  Date of evaluation: 7/31/2020  Provider: Miah Ascencio MD    [address] [place] [address]       Chief Complaint   Patient presents with    Other     J tube taken out Tuesday - bleeding from site unable to stop        [facility]    Sulma Bravo is a 39 y.o. female who presents to the emergency department with J tube issue. Patient already seen and prior doc tried to admit patient but Hospitalist felt admission not warranted so asked me to evaluate and possible dispo. Patient with history of speech and language deficit secondary to CVA, hypothyroidism, hyperlipidemia, ADHD who presented to hospital for abdominal pain. History therefore limited. When talking with group home sine her J tube was pulled patient has had pain. Patient moaning at bedside. Otherwise cannot obtain any history. Nursing Notes were reviewed. Including nursing noted for FM, Surgical History, Past Medical History, Social History, vitals, and allergies; agree with all.      REVIEW OF SYSTEMS       Review of Systems   Unable to perform ROS: Other   Patients mental status    PAST MEDICAL HISTORY     Past Medical History:   Diagnosis Date    ADHD (attention deficit hyperactivity disorder)     MARIA TERESA (acute kidney injury) ([address] 8/1/2020    Anxiety     Dysphagia     1984    Feeding by G-tube ([address]     1984    Head injury     1984    Hydrocephalus ([address]     S/P SHUNT 1984    Hyperlipidemia     Hypothyroidism     Paraparesis ([address]     1984    Seizure disorder ([address]     Seizure disorder ([address]     Speech 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     46    Vitamin D deficiency        SURGICAL HISTORY       Past Surgical History:   Procedure Laterality Date    CSF SHUNT      GASTROSTOMY TUBE PLACEMENT         CURRENT MEDICATIONS       Previous Medications    ACETAMINOPHEN (TYLENOL) Smokeless tobacco: Never Used    Tobacco comment: Patient counseled on 7/9/13. Substance and Sexual Activity    Alcohol use: No    Drug use: No    Sexual activity: Never   Lifestyle    Physical activity     Days per week: Not on file     Minutes per session: Not on file    Stress: Not on file   Relationships    Social connections     Talks on phone: Not on file     Gets together: Not on file     Attends Baptism service: Not on file     Active member of club or organization: Not on file     Attends meetings of clubs or organizations: Not on file     Relationship status: Not on file    Intimate partner violence     Fear of current or ex partner: Not on file     Emotionally abused: Not on file     Physically abused: Not on file     Forced sexual activity: Not on file   Other Topics Concern    Not on file   Social History Narrative    Not on file       PHYSICAL EXAM       ED Triage Vitals   BP Temp Temp Source Pulse Resp SpO2 Height Weight   07/31/20 2026 07/31/20 1813 07/31/20 1813 07/31/20 1813 07/31/20 1813 07/31/20 1813 08/01/20 0516 08/01/20 0516   (!) 122/91 96.9 °F (36.1 °C) Axillary 112 24 94 % 5' (1.524 m) 119 lb (54 kg)       Physical Exam  Vitals signs and nursing note reviewed. Constitutional:       Appearance: She is well-developed. She is ill-appearing. She is not diaphoretic. HENT:      Head: Normocephalic and atraumatic. Right Ear: External ear normal.      Left Ear: External ear normal.   Eyes:      General:         Right eye: No discharge. Left eye: No discharge. Conjunctiva/sclera: Conjunctivae normal.      Pupils: Pupils are equal, round, and reactive to light. Neck:      Musculoskeletal: Normal range of motion and neck supple. Cardiovascular:      Rate and Rhythm: Normal rate and regular rhythm. Heart sounds: No murmur. Pulmonary:      Effort: Pulmonary effort is normal. No respiratory distress. Breath sounds: Normal breath sounds.  No wheezing or rales.   Abdominal:      General: Bowel sounds are normal. There is no distension. Palpations: Abdomen is soft. There is no mass. Tenderness: There is no abdominal tenderness. There is no guarding or rebound. Comments: Significant erythema to G/J tube site without and G/J tube in place. Genitourinary:     Comments: Deferred  Musculoskeletal: Normal range of motion. General: No deformity. Skin:     General: Skin is warm. Findings: No erythema or rash. Neurological:      Mental Status: Mental status is at baseline. She is not disoriented. Cranial Nerves: No cranial nerve deficit. Motor: No atrophy. DIAGNOSTIC RESULTS     EKG: All EKG's are interpreted by the Emergency Department Physician who either signs or Co-signs this chart in the absence of acardiologist.    None    RADIOLOGY:   Non-plain film images such as CT, Ultrasoundand MRI are read by the radiologist. Bola Avendano radiographic images are visualized and preliminarily interpreted by the emergency physician with the below findings:       Impression    No definite acute abnormality identified.         There is a small amount of perisplenic fluid which may relate to the    ventriculoperitoneal shunt (the ventriculoperitoneal shunt terminates in the    left upper abdomen).         No evidence of small bowel obstruction.       ED BEDSIDE ULTRASOUND:   Performed by ED Physician - none    LABS:  Labs Reviewed   COMPREHENSIVE METABOLIC PANEL - Abnormal; Notable for the following components:       Result Value    CO2 20 (*)     BUN 27 (*)     CREATININE 1.2 (*)     GFR Non- 48 (*)     GFR  59 (*)     AST 47 (*)     All other components within normal limits    Narrative:     Performed at:  [address] [address]   Phone [phone]   BASIC METABOLIC PANEL - Abnormal; Notable for the following components:    BUN 23 (*) CREATININE 1.2 (*)     GFR Non- 48 (*)     GFR  61 (*)     All other components within normal limits    Narrative:     Performed at:  [facility]  [address] [facility] [place] [address]   Phone [phone]   CBC WITH AUTO DIFFERENTIAL    Narrative:     Performed at:  [facility]  [address] [facility] [place] [facility] [address]   Phone [phone]   LACTIC ACID, PLASMA    Narrative:     Performed at:  [facility]  [address] [facility] [place] [facility] [address]   Phone [phone]   URINE RT REFLEX TO CULTURE       All other labs were withinnormal range or not returned as of this dictation. EMERGENCY DEPARTMENT COURSE and DIFFERENTIAL DIAGNOSIS/MDM:     PMH, Surgical Hx, FH, Social Hx reviewed by myself (ETOH usage, Tobacco usage, Drug usage reviewed by myself, no pertinent Hx)- No Pertinent Hx     Old records were reviewed by me     Patient has mild MARIA TERESA with cellulitis to abdominal wall. Abx started with fluids. CT shows perisplenic fluid unclear etiology. Admission for GI to see and further inpatient assessment. CRITICAL CARE TIME   Total Critical Caretime was 21 minutes, excluding separately reportable procedures. There was a high probability of clinically significant/life threatening deterioration in the patient's condition which required my urgent intervention. PROCEDURES:  Unlessotherwise noted below, none    FINAL IMPRESSION      1. MARIA TERESA (acute kidney injury) ([address]    2. Cellulitis, unspecified cellulitis site    3. Complication of gastrostomy tube ([address]    4.  Abnormal CT of the abdomen          DISPOSITION/PLAN   DISPOSITION Admitted 08/01/2020 04:16:45 AM    PATIENT REFERRED TO:  Juwan Oliva MD  [address]            DISCHARGE MEDICATIONS:  New Prescriptions    No medications on file          (Please note that portions ofthis note were completed with a voice recognition program.  Efforts were made to edit the dictations but occasionally words are mis-transcribed.)    Trent Raman MD(electronically signed)  Attending Emergency Physician        Trent Raman MD  08/01/20 8903

## 2020-08-01 NOTE — ED NOTES
RN at bedside pt placed in hospital gown and caregiver on plan of care.       Yamilka Grace RN  08/01/20 [id number]

## 2020-08-01 NOTE — PROGRESS NOTES
Patient alert at this time; pt is smiling and does not appear in distress presently. Awaiting caregiver's arrival for additional information. No diet order and no urine collected yesterday as ordered for patient. Will address with hospitalist. Bed alarm in place. Will continue to monitor.  Electronically signed by Kurt Hargrove RN on 8/1/2020 at 8:37 AM

## 2020-08-01 NOTE — PROGRESS NOTES
4 Eyes Skin Assessment     The patient is being assess for  Admission    I agree that 2 RN's have performed a thorough Head to Toe Skin Assessment on the patient. ALL assessment sites listed below have been assessed. Areas assessed by both nurses:   [x]   Head, Face, and Ears   [x]   Shoulders, Back, and Chest  [x]   Arms, Elbows, and Hands   [x]   Coccyx, Sacrum, and IschIum  [x]   Legs, Feet, and Heels        Does the Patient have Skin Breakdown?   Yes LDA WOUND CARE was Initiated documentation include the Jordyn-wound, Wound Assessment, Measurements, Dressing Treatment, Drainage, and Color\",       Redness on bottom Micah Prevention initiated:  No   Wound Care Orders initiated:  No      WOC nurse consulted for Pressure Injury (Stage 3,4, Unstageable, DTI, NWPT, and Complex wounds), New and Established Ostomies:  No      Nurse 1 eSignature: Electronically signed by Elizabeth Cannon RN on 8/1/20 at 7:48 AM EDT    **SHARE this note so that the co-signing nurse is able to place an eSignature**    Nurse 2 eSignature: Diann Fair RN on 8/1/20 at 7:50 AM EDT

## 2020-08-02 PROBLEM — L03.90 CELLULITIS: Status: ACTIVE | Noted: 2020-08-02

## 2020-08-02 PROCEDURE — G0378 HOSPITAL OBSERVATION PER HR: HCPCS

## 2020-08-02 PROCEDURE — 96366 THER/PROPH/DIAG IV INF ADDON: CPT

## 2020-08-02 PROCEDURE — 6370000000 HC RX 637 (ALT 250 FOR IP): Performed by: INTERNAL MEDICINE

## 2020-08-02 PROCEDURE — 1200000000 HC SEMI PRIVATE

## 2020-08-02 PROCEDURE — 96376 TX/PRO/DX INJ SAME DRUG ADON: CPT

## 2020-08-02 PROCEDURE — 2580000003 HC RX 258: Performed by: INTERNAL MEDICINE

## 2020-08-02 PROCEDURE — 6360000002 HC RX W HCPCS: Performed by: INTERNAL MEDICINE

## 2020-08-02 PROCEDURE — 94760 N-INVAS EAR/PLS OXIMETRY 1: CPT

## 2020-08-02 RX ADMIN — NYSTATIN: 100000 CREAM TOPICAL at 21:00

## 2020-08-02 RX ADMIN — VALPROIC ACID 250 MG: 250 SOLUTION ORAL at 09:12

## 2020-08-02 RX ADMIN — MORPHINE SULFATE 1 MG: 2 INJECTION, SOLUTION INTRAMUSCULAR; INTRAVENOUS at 13:19

## 2020-08-02 RX ADMIN — CETIRIZINE HYDROCHLORIDE 5 MG: 10 TABLET, FILM COATED ORAL at 09:03

## 2020-08-02 RX ADMIN — VANCOMYCIN HYDROCHLORIDE 750 MG: 750 INJECTION, POWDER, LYOPHILIZED, FOR SOLUTION INTRAVENOUS at 04:25

## 2020-08-02 RX ADMIN — MORPHINE SULFATE 1 MG: 2 INJECTION, SOLUTION INTRAMUSCULAR; INTRAVENOUS at 17:15

## 2020-08-02 RX ADMIN — AMPICILLIN SODIUM AND SULBACTAM SODIUM 1.5 G: 1; .5 INJECTION, POWDER, FOR SOLUTION INTRAMUSCULAR; INTRAVENOUS at 02:23

## 2020-08-02 RX ADMIN — NYSTATIN: 100000 CREAM TOPICAL at 04:15

## 2020-08-02 RX ADMIN — POLYETHYLENE GLYCOL 3350 17 G: 17 POWDER, FOR SOLUTION ORAL at 09:03

## 2020-08-02 RX ADMIN — DEXTROAMPHETAMINE SACCHARATE, AMPHETAMINE ASPARTATE MONOHYDRATE, DEXTROAMPHETAMINE SULFATE, AND AMPHETAMINE SULFATE 20 MG: 2.5; 2.5; 2.5; 2.5 CAPSULE, EXTENDED RELEASE ORAL at 09:03

## 2020-08-02 RX ADMIN — NYSTATIN: 100000 CREAM TOPICAL at 08:56

## 2020-08-02 RX ADMIN — VALPROIC ACID 250 MG: 250 SOLUTION ORAL at 13:14

## 2020-08-02 RX ADMIN — MORPHINE SULFATE 1 MG: 2 INJECTION, SOLUTION INTRAMUSCULAR; INTRAVENOUS at 04:14

## 2020-08-02 RX ADMIN — AMPICILLIN SODIUM AND SULBACTAM SODIUM 1.5 G: 1; .5 INJECTION, POWDER, FOR SOLUTION INTRAMUSCULAR; INTRAVENOUS at 09:02

## 2020-08-02 RX ADMIN — MORPHINE SULFATE 1 MG: 2 INJECTION, SOLUTION INTRAMUSCULAR; INTRAVENOUS at 09:03

## 2020-08-02 RX ADMIN — VANCOMYCIN HYDROCHLORIDE 750 MG: 750 INJECTION, POWDER, LYOPHILIZED, FOR SOLUTION INTRAVENOUS at 16:39

## 2020-08-02 RX ADMIN — AMPICILLIN SODIUM AND SULBACTAM SODIUM 1.5 G: 1; .5 INJECTION, POWDER, FOR SOLUTION INTRAMUSCULAR; INTRAVENOUS at 15:11

## 2020-08-02 RX ADMIN — LEVOTHYROXINE SODIUM 25 MCG: 0.03 TABLET ORAL at 09:03

## 2020-08-02 ASSESSMENT — PAIN SCALES - GENERAL
PAINLEVEL_OUTOF10: 5
PAINLEVEL_OUTOF10: 7
PAINLEVEL_OUTOF10: 4
PAINLEVEL_OUTOF10: 4
PAINLEVEL_OUTOF10: 7
PAINLEVEL_OUTOF10: 4
PAINLEVEL_OUTOF10: 5

## 2020-08-02 ASSESSMENT — PAIN DESCRIPTION - LOCATION
LOCATION: ABDOMEN
LOCATION: ABDOMEN

## 2020-08-02 ASSESSMENT — PAIN DESCRIPTION - PAIN TYPE
TYPE: ACUTE PAIN
TYPE: ACUTE PAIN

## 2020-08-02 NOTE — PLAN OF CARE
Problem: Skin Integrity:  Goal: Will show no infection signs and symptoms  Description: Will show no infection signs and symptoms  8/1/2020 2223 by Camryn Ashby RN  Outcome: Ongoing  8/1/2020 0838 by Maude Braun RN  Outcome: Ongoing  Goal: Absence of new skin breakdown  Description: Absence of new skin breakdown  8/1/2020 2223 by Camryn Ashby RN  Outcome: Ongoing  8/1/2020 0838 by Maude Braun RN  Outcome: Ongoing     Problem: Falls - Risk of:  Goal: Will remain free from falls  Description: Will remain free from falls  8/1/2020 2223 by Camryn Ashby RN  Outcome: Ongoing  8/1/2020 0838 by Maude Braun RN  Outcome: Ongoing  Goal: Absence of physical injury  Description: Absence of physical injury  8/1/2020 2223 by Camryn Ashby RN  Outcome: Ongoing  8/1/2020 0838 by Maude Braun RN  Outcome: Ongoing

## 2020-08-02 NOTE — H&P
Consult. Plan 1. Patient seen regarding replacement of G tube. Eating at present if inadequate calorie counts then consideration at future date s/p removal for cellulitis.

## 2020-08-02 NOTE — CONSULTS
[address] [facility]                                  CONSULTATION    PATIENT NAME: Remington Hoang                       :        1975  MED REC NO:   [id number]                          ROOM:       4112  ACCOUNT NO:   [de-identified]                           ADMIT DATE: 2020  PROVIDER:     Lillie Avalos MD    CONSULT DATE:  2020    HISTORY OF PRESENT ILLNESS:  A 27-year-old female was seen in  consultation for possible replacement of G-tube. She is  institutionalized with significant neurological deficits. Apparently,  her G-tube was recently removed because of the possibility of cellulitis  at the site. She does eat with modifications. She has been consuming  some dietary consumption during this admission. No history of recurrent  aspiration pneumonitis. She can provide no other clinical history. Her  records obtained for medical review. PAST MEDICAL HISTORY:  _____. PAST SURGICAL HISTORY:  G-tube, CSF shunt. CURRENT MEDICATIONS:  Claritin, Desyrel, Depakote, Synthroid, Adderall,  GlycoLax. ALLERGIES:  PHENYTOIN. FAMILY HISTORY:  Noncontributory. SOCIAL HISTORY:  No tobacco or alcohol. REVIEW OF SYSTEMS:  Noncontributory. PHYSICAL EXAMINATION:  VITAL SIGNS:   Stable, afebrile. HEENT:  No conjunctival icterus. NECK:  No adenopathy. CHEST:  Clear. CARDIOVASCULAR:  Regular rate and rhythm. ABDOMEN:  Bowel sounds present. Soft, some diffuse erythema at the  dressing site. No subcutaneous abscess or fistulous drainage. RECTAL:  Deferred. EXTREMITIES:  No edema. LABORATORY DATA:  White count 7800, hemoglobin 14.2. Liver test,  normal.    CT scan, small amount of perisplenic fluid,  shunt. IMPRESSION AND PLAN:  Abdominal wall cellulitis being managed by  hospital service. The patient was seen regarding possible replacement  of G-tube.   She is eating with dietary modifications. If the patient  does not be able to consume adequate nutrition and _____ calorie count,  could have replacement at some point after resolution of cellulitis at  this point if absolutely necessary. This could be done at a later date  if necessary.         Liang Sood MD    D: 08/02/2020 11:02:45       T: 08/02/2020 12:46:41     [id number]  Job#: [id number]     Doc#: [id number]    CC:

## 2020-08-02 NOTE — PROGRESS NOTES
Pt found lying in bed with the IV pulled out-left. IV site bleeding dressing applied. Pt also pulled out her abdominal dressing. New dressing applied.  Electronically signed by Vernon Ybarra RN on 8/2/2020 at 7:09 PM SUBJECTIVE / OVERNIGHT EVENTS: pt denies chest pain, sob    MEDICATIONS  (STANDING):  aMIOdarone    Tablet 200 milliGRAM(s) Oral daily  aspirin enteric coated 81 milliGRAM(s) Oral daily  atorvastatin 80 milliGRAM(s) Oral daily  heparin   Injectable 5000 Unit(s) SubCutaneous every 8 hours  levothyroxine 50 MICROGram(s) Oral daily  sodium chloride 0.9%. 1000 milliLiter(s) (50 mL/Hr) IV Continuous <Continuous>  ticagrelor 90 milliGRAM(s) Oral every 12 hours    MEDICATIONS  (PRN):    Vital Signs Last 24 Hrs  T(C): 36.3 (17 May 2020 12:16), Max: 36.5 (17 May 2020 06:28)  T(F): 97.4 (17 May 2020 12:16), Max: 97.7 (17 May 2020 06:28)  HR: 76 (17 May 2020 12:16) (70 - 81)  BP: 116/77 (17 May 2020 12:16) (116/77 - 151/90)  BP(mean): --  RR: 18 (17 May 2020 12:16) (17 - 18)  SpO2: 99% (17 May 2020 12:16) (99% - 100%)    Constitutional: No fever, fatigue  Skin: No rash.  Eyes: No recent vision problems or eye pain.  ENT: No congestion, ear pain, or sore throat.  Cardiovascular: No chest pain or palpation.  Respiratory: No cough, shortness of breath, congestion, or wheezing.  Gastrointestinal: No abdominal pain, nausea, vomiting, or diarrhea.  Genitourinary: No dysuria.  Musculoskeletal: No joint swelling.  Neurologic: No headache.    PHYSICAL EXAM:  GENERAL: NAD  EYES: EOMI, PERRLA  NECK: Supple, No JVD  CHEST/LUNG: dec breath sounds rt base  HEART:  S1 , S2 +  ABDOMEN: soft , bs+  EXTREMITIES:  no edema  NEUROLOGY:alert awake oriented     LABS:      137  |  106  |  24<H>  ----------------------------<  108<H>  4.3   |  20<L>  |  1.46<H>    Ca    8.8      17 May 2020 05:34    TPro  6.8  /  Alb  3.7  /  TBili  0.5  /  DBili      /  AST  27  /  ALT  60<H>  /  AlkPhos  65  -17    Creatinine Trend: 1.46 <--, 1.59 <--, 2.15 <--, 2.39 <--                        10.2   6.78  )-----------( 209      ( 17 May 2020 05:34 )             31.7     Urine Studies:  Urinalysis Basic - ( 15 May 2020 16:20 )    Color: YELLOW / Appearance: CLEAR / S.023 / pH: 6.5  Gluc: NEGATIVE / Ketone: NEGATIVE  / Bili: NEGATIVE / Urobili: SMALL   Blood: NEGATIVE / Protein: 10 / Nitrite: NEGATIVE   Leuk Esterase: NEGATIVE / RBC:  / WBC    Sq Epi:  / Non Sq Epi:  / Bacteria:       Sodium, Random Urine: 90 mmol/L (05-15 @ 16:20)  Potassium, Random Urine: 64.0 mmol/L (05-15 @ 16:20)  Chloride, Random Urine: 64 mmol/L (05-15 @ 16:20)  Creatinine, Random Urine: 138.70 mg/dL (05-15 @ 16:20)          LIVER FUNCTIONS - ( 17 May 2020 05:34 )  Alb: 3.7 g/dL / Pro: 6.8 g/dL / ALK PHOS: 65 u/L / ALT: 60 u/L / AST: 27 u/L / GGT: x             Blood: NEGATIVE / Protein: 10 / Nitrite: NEGATIVE   Leuk Esterase: NEGATIVE / RBC:  / WBC    Sq Epi:  / Non Sq Epi:  / Bacteria:       Sodium, Random Urine: 90 mmol/L (05-15 @ 16:20)  Potassium, Random Urine: 64.0 mmol/L (05-15 @ 16:20)  Chloride, Random Urine: 64 mmol/L (05-15 @ 16:20)  Creatinine, Random Urine: 138.70 mg/dL (05-15 @ 16:20)          LIVER FUNCTIONS - ( 16 May 2020 04:39 )  Alb: 3.7 g/dL / Pro: 6.9 g/dL / ALK PHOS: 68 u/L / ALT: 83 u/L / AST: 44 u/L / GGT: x                 Urinalysis Basic - ( 15 May 2020 16:20 )    Color: YELLOW / Appearance: CLEAR / S.023 / pH: 6.5  Gluc: NEGATIVE / Ketone: NEGATIVE  / Bili: NEGATIVE / Urobili: SMALL   Blood: NEGATIVE / Protein: 10 / Nitrite: NEGATIVE   Leuk Esterase: NEGATIVE / RBC: x / WBC x   Sq Epi: x / Non Sq Epi: x / Bacteria: x        RADIOLOGY & ADDITIONAL TESTS:    Imaging Personally Reviewed:    Consultant(s) Notes Reviewed:      Care Discussed with Consultants/Other Providers:

## 2020-08-02 NOTE — PROGRESS NOTES
Pt alert and oriented, resting in bed with caregiver at bedside. Respirations even and unlabored. No signs of distress noted. VSS. Dressing to abd site is clean, dry, and intact. Meds given per MAR. Bed alarm on, call light within reach. Tele cam in place.  Will continue to monitor

## 2020-08-02 NOTE — PROGRESS NOTES
Patient alert at this time. Patient pointing to her abdomen and nodded head to convey she was in pain. 1mg IV Morphine and morning medications given without complication. Abdominal dressing changed and is CDI. Nystatin cream applied to erythematous areas. Will continue to monitor.  Electronically signed by Diane Easton RN on 8/2/2020 at 9:29 AM'

## 2020-08-02 NOTE — PLAN OF CARE
Problem: Skin Integrity:  Goal: Will show no infection signs and symptoms  Description: Will show no infection signs and symptoms  8/2/2020 0930 by Abimael East RN  Outcome: Ongoing  8/1/2020 2223 by Bang Stephens RN  Outcome: Ongoing  Goal: Absence of new skin breakdown  Description: Absence of new skin breakdown  8/2/2020 0930 by Abimael East RN  Outcome: Ongoing  8/1/2020 2223 by Bang Stephens RN  Outcome: Ongoing     Problem: Falls - Risk of:  Goal: Will remain free from falls  Description: Will remain free from falls  8/2/2020 0930 by Abimael East RN  Outcome: Ongoing  8/1/2020 2223 by Bang Stephens RN  Outcome: Ongoing  Goal: Absence of physical injury  Description: Absence of physical injury  8/2/2020 0930 by Abimael East RN  Outcome: Ongoing  8/1/2020 2223 by Bang Stephens RN  Outcome: Ongoing

## 2020-08-03 LAB
CREAT SERPL-MCNC: 0.6 MG/DL (ref 0.6–1.1)
GFR AFRICAN AMERICAN: >60
GFR NON-AFRICAN AMERICAN: >60
VANCOMYCIN TROUGH: 10.5 UG/ML (ref 10–20)

## 2020-08-03 PROCEDURE — 82565 ASSAY OF CREATININE: CPT

## 2020-08-03 PROCEDURE — 36415 COLL VENOUS BLD VENIPUNCTURE: CPT

## 2020-08-03 PROCEDURE — 6370000000 HC RX 637 (ALT 250 FOR IP): Performed by: INTERNAL MEDICINE

## 2020-08-03 PROCEDURE — 94760 N-INVAS EAR/PLS OXIMETRY 1: CPT

## 2020-08-03 PROCEDURE — 1200000000 HC SEMI PRIVATE

## 2020-08-03 PROCEDURE — 2580000003 HC RX 258: Performed by: STUDENT IN AN ORGANIZED HEALTH CARE EDUCATION/TRAINING PROGRAM

## 2020-08-03 PROCEDURE — 80202 ASSAY OF VANCOMYCIN: CPT

## 2020-08-03 PROCEDURE — 92610 EVALUATE SWALLOWING FUNCTION: CPT

## 2020-08-03 PROCEDURE — 2580000003 HC RX 258: Performed by: INTERNAL MEDICINE

## 2020-08-03 PROCEDURE — 6360000002 HC RX W HCPCS: Performed by: INTERNAL MEDICINE

## 2020-08-03 RX ORDER — OXYCODONE HYDROCHLORIDE 5 MG/1
5 TABLET ORAL EVERY 6 HOURS PRN
Status: DISCONTINUED | OUTPATIENT
Start: 2020-08-03 | End: 2020-08-05 | Stop reason: HOSPADM

## 2020-08-03 RX ORDER — AMOXICILLIN AND CLAVULANATE POTASSIUM 875; 125 MG/1; MG/1
1 TABLET, FILM COATED ORAL 2 TIMES DAILY
Qty: 14 TABLET | Refills: 0 | Status: SHIPPED | OUTPATIENT
Start: 2020-08-03 | End: 2020-08-04 | Stop reason: SDUPTHER

## 2020-08-03 RX ORDER — AMOXICILLIN AND CLAVULANATE POTASSIUM 875; 125 MG/1; MG/1
1 TABLET, FILM COATED ORAL 2 TIMES DAILY
Qty: 14 TABLET | Refills: 0 | Status: SHIPPED | OUTPATIENT
Start: 2020-08-03 | End: 2020-08-03 | Stop reason: SDUPTHER

## 2020-08-03 RX ADMIN — TRAZODONE HYDROCHLORIDE 50 MG: 50 TABLET ORAL at 22:26

## 2020-08-03 RX ADMIN — VANCOMYCIN HYDROCHLORIDE 750 MG: 750 INJECTION, POWDER, LYOPHILIZED, FOR SOLUTION INTRAVENOUS at 05:17

## 2020-08-03 RX ADMIN — Medication 10 ML: at 08:40

## 2020-08-03 RX ADMIN — TRAZODONE HYDROCHLORIDE 50 MG: 50 TABLET ORAL at 00:28

## 2020-08-03 RX ADMIN — VALPROIC ACID 250 MG: 250 SOLUTION ORAL at 00:28

## 2020-08-03 RX ADMIN — CETIRIZINE HYDROCHLORIDE 5 MG: 10 TABLET, FILM COATED ORAL at 08:37

## 2020-08-03 RX ADMIN — LEVOTHYROXINE SODIUM 25 MCG: 0.03 TABLET ORAL at 08:40

## 2020-08-03 RX ADMIN — NYSTATIN: 100000 CREAM TOPICAL at 08:39

## 2020-08-03 RX ADMIN — NYSTATIN: 100000 CREAM TOPICAL at 22:27

## 2020-08-03 RX ADMIN — VALPROIC ACID 250 MG: 250 SOLUTION ORAL at 08:39

## 2020-08-03 RX ADMIN — VALPROIC ACID 250 MG: 250 SOLUTION ORAL at 22:27

## 2020-08-03 RX ADMIN — MORPHINE SULFATE 1 MG: 2 INJECTION, SOLUTION INTRAMUSCULAR; INTRAVENOUS at 07:01

## 2020-08-03 RX ADMIN — VALPROIC ACID 250 MG: 250 SOLUTION ORAL at 13:42

## 2020-08-03 RX ADMIN — DEXTROAMPHETAMINE SACCHARATE, AMPHETAMINE ASPARTATE MONOHYDRATE, DEXTROAMPHETAMINE SULFATE, AND AMPHETAMINE SULFATE 20 MG: 2.5; 2.5; 2.5; 2.5 CAPSULE, EXTENDED RELEASE ORAL at 08:39

## 2020-08-03 RX ADMIN — AMPICILLIN SODIUM AND SULBACTAM SODIUM 1.5 G: 1; .5 INJECTION, POWDER, FOR SOLUTION INTRAMUSCULAR; INTRAVENOUS at 01:50

## 2020-08-03 RX ADMIN — MORPHINE SULFATE 1 MG: 2 INJECTION, SOLUTION INTRAMUSCULAR; INTRAVENOUS at 00:28

## 2020-08-03 RX ADMIN — POLYETHYLENE GLYCOL 3350 17 G: 17 POWDER, FOR SOLUTION ORAL at 08:39

## 2020-08-03 RX ADMIN — AMPICILLIN SODIUM AND SULBACTAM SODIUM 1.5 G: 1; .5 INJECTION, POWDER, FOR SOLUTION INTRAMUSCULAR; INTRAVENOUS at 13:37

## 2020-08-03 RX ADMIN — AMPICILLIN SODIUM AND SULBACTAM SODIUM 1.5 G: 1; .5 INJECTION, POWDER, FOR SOLUTION INTRAMUSCULAR; INTRAVENOUS at 08:39

## 2020-08-03 ASSESSMENT — PAIN DESCRIPTION - ORIENTATION
ORIENTATION: LEFT;LOWER

## 2020-08-03 ASSESSMENT — PAIN DESCRIPTION - LOCATION
LOCATION: ABDOMEN

## 2020-08-03 ASSESSMENT — PAIN DESCRIPTION - ONSET
ONSET: ON-GOING

## 2020-08-03 ASSESSMENT — PAIN DESCRIPTION - PAIN TYPE
TYPE: ACUTE PAIN;DEEP SOMATIC PAIN

## 2020-08-03 ASSESSMENT — PAIN DESCRIPTION - PROGRESSION
CLINICAL_PROGRESSION: NOT CHANGED

## 2020-08-03 ASSESSMENT — PAIN SCALES - GENERAL
PAINLEVEL_OUTOF10: 6
PAINLEVEL_OUTOF10: 6
PAINLEVEL_OUTOF10: 7
PAINLEVEL_OUTOF10: 4
PAINLEVEL_OUTOF10: 2
PAINLEVEL_OUTOF10: 6

## 2020-08-03 ASSESSMENT — PAIN DESCRIPTION - DESCRIPTORS
DESCRIPTORS: PATIENT UNABLE TO DESCRIBE

## 2020-08-03 ASSESSMENT — PAIN SCALES - WONG BAKER
WONGBAKER_NUMERICALRESPONSE: 2

## 2020-08-03 ASSESSMENT — PAIN DESCRIPTION - FREQUENCY
FREQUENCY: INTERMITTENT

## 2020-08-03 NOTE — CARE COORDINATION
INITIAL CASE MANAGEMENT ASSESSMENT    Reviewed chart, spoke with patients  Karishma Rodriguez at [phone] to assess possible discharge needs. Explained Case Management role/services. Living Situation: house with grandfather , who is not her caregiver due to his age. Pt receives 24/7 caregivers from [place], which is not a home care agency. Home address has been verified, 2 steps to enter then one floor plan. ADLs: pt is total care. DME: rolling walker and wheelchair    PT/OT Recs: n/a     Active Services: [facility] 24/7 caregivers and pt uses [facility]. Transportation: [facility] will transport pt home after 300pm     Medications: Arcenio Grullon is wanting prescriptions called to [facility] at [phone]. She will  today. PCP: [facility]      HD/PD: n/a    PLAN/COMMENTS: Arcenio Grullon is requesting home care rn pt and ot for pt at home. She has chosen [facility] care since she has used them in the past.  She is requesting rx's be called into [facility]. She will have aide transport pt home around 300 or after. She denies any other dc needs. Electronically signed by JOHNY Da Silva on 8/3/2020 at 1:27 PM    SW/MICHAEL provided contact information for patient or family to call with any questions. SW/CM will follow and assist as needed.

## 2020-08-03 NOTE — PROGRESS NOTES
Hospitalist Progress Note      PCP: Jasmeet Ya MD    Chief Complaint. Presented to hospital for abdominal pain    Date of Admission: 7/31/2020      Subjective:   Patient seems comfortable in bed, caregiver at bedside, updated on management plan. Medications:  Reviewed    Infusion Medications    sodium chloride 50 mL/hr at 08/01/20 1730     Scheduled Medications    vancomycin  750 mg Intravenous Q12H    sodium chloride flush  10 mL Intravenous 2 times per day    ampicillin-sulbactam  1.5 g Intravenous Q6H    vancomycin (VANCOCIN) intermittent dosing (placeholder)   Other RX Placeholder    amphetamine-dextroamphetamine  20 mg Oral QAM    levothyroxine  25 mcg Oral Daily    cetirizine  5 mg Oral Daily    nystatin   Topical BID    traZODone  50 mg Oral Nightly    valproic acid  250 mg Oral TID    polyethylene glycol  17 g Oral Daily     PRN Meds: sodium chloride flush, ondansetron, morphine, menthol-zinc oxide      Intake/Output Summary (Last 24 hours) at 8/2/2020 2039  Last data filed at 8/2/2020 1807  Gross per 24 hour   Intake 300 ml   Output --   Net 300 ml       Physical Exam Performed:    BP (!) 142/84   Pulse 64   Temp 97.4 °F (36.3 °C) (Axillary)   Resp 18   Ht 5' (1.524 m)   Wt 117 lb 11.6 oz (53.4 kg)   SpO2 94%   BMI 22.99 kg/m²     General appearance: No apparent distress,   HEENT:  Conjunctivae/corneas clear. Neck: Supple, with full range of motion. Respiratory:  Normal respiratory effort. Clear to auscultation, bilaterally without Rales/Wheezes/Rhonchi. Cardiovascular: Regular rate and rhythm with normal S1/S2 without murmurs or rubs  Abdomen: The area of redness, tenderness, warmth noticed around previous site of PEG tube  Musculoskeletal: No cyanosis or edema bilaterally  Neurologic:  without any focal sensory/motor deficits.  grossly non-focal.  Psychiatric: Alert and oriented, Normal mood  Peripheral Pulses: +2 palpable, equal bilaterally       Labs:   Recent Labs 07/31/20 2118   WBC 7.8   HGB 14.2   HCT 41.6        Recent Labs     07/31/20 2118 08/01/20  0227    140   K  --  4.3    107   CO2 20* 21   BUN 27* 23*   CREATININE 1.2* 1.2*   CALCIUM 9.3 8.6     Recent Labs     07/31/20 2118   AST 47*   ALT 28   BILITOT 0.3   ALKPHOS 44     No results for input(s): INR in the last 72 hours. No results for input(s): Vallarie Brunette in the last 72 hours. Urinalysis:      Lab Results   Component Value Date    NITRU Negative 07/11/2019    WBCUA 1 07/11/2019    BACTERIA 1+ 07/11/2019    RBCUA 3 07/11/2019    BLOODU MODERATE 07/11/2019    SPECGRAV 1.011 07/11/2019    GLUCOSEU Negative 07/11/2019       Radiology:  CT ABDOMEN PELVIS W IV CONTRAST Additional Contrast? IV   Final Result   No definite acute abnormality identified. There is a small amount of perisplenic fluid which may relate to the   ventriculoperitoneal shunt (the ventriculoperitoneal shunt terminates in the   left upper abdomen). No evidence of small bowel obstruction. XR CHEST PORTABLE   Final Result   Unremarkable portable chest radiograph. Assessment/Plan:    Active Hospital Problems    Diagnosis    Cellulitis [L03.90]    Cellulitis, abdominal wall [L03.311]    Abdominal wall cellulitis [A41.243]    MARIA TERESA (acute kidney injury) ([facility] [address] [N17.9]    Feeding by G-tube [facility]) [Z93.1]     Patient is a 55-year-old female from assisted living facility has past medical history of speech and language deficit secondary to CVA, hypothyroidism, hyperlipidemia, ADHD who presented to hospital for abdominal pain. Patient is a very poor historian and has a speech deficit, most of the history is from patient's chart, ED nursing notes. Patient recently had G-tube removed, surrounding area of the J-tube appeared to be painful and red. Patient nods her head to abdominal pain and has been asking for pain medication. Patient seems to be very uncomfortable in bed.   Patient denies fever.     Assessment  Abdominal pain secondary to abdominal wall cellulitis  Hypothyroidism  Hyperlipidemia  Elevated creatinine from baseline     Plan  Will initiate IV vancomycin, Unasyn, wound care, CT abdomen and pelvis reviewed-unremarkable for intra-abdominal pathology, patient has perisplenic fluid from  shunt  Gentle IV fluid hydration, consulted speech therapy for dietary recommendations, advance as tolerated  Continue to monitor CBC, BMP, blood culture  Diet: DIET DYSPHAGIA PUREED;  Moderately Thick (Honey)  Code Status: Full Code    PT/OT Eval Status: ordered    Dispo - pending clinical improvement, likely discharge 2 days    Orville Pederson MD

## 2020-08-03 NOTE — PROGRESS NOTES
Clinical Pharmacy Note  Vancomycin Consult    Kandi Headley is a 39 y.o. female ordered Vancomycin for cellulitis; consult received from Dr. Sultana Mayfield to manage therapy. Also receiving . Patient Active Problem List   Diagnosis    Head injury    [facility] [address]    Speech 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    Dysphagia    Feeding by G-tube ([facility] [address]    Hypothyroidism    Hyperlipidemia    Anxiety    [facility]    Hydrocephalus ([facility] [address] [facility]    ADHD (attention deficit hyperactivity disorder)    Vitamin D deficiency    Cellulitis, abdominal wall    Abdominal wall cellulitis    MARIA TERESA (acute kidney injury) ([facility] [address]    Cellulitis       Allergies:  Phenytoin sodium extended     Temp max:  Temp (24hrs), Av.2 °F (36.8 °C), Min:97.8 °F (36.6 °C), Max:98.5 °F (36.9 °C)      Recent Labs     20  2118   WBC 7.8       Recent Labs     20  2118 20  0227 20  1608   BUN 27* 23*  --    CREATININE 1.2* 1.2* 0.6         Intake/Output Summary (Last 24 hours) at 8/3/2020 1836  Last data filed at 8/3/2020 5406  Gross per 24 hour   Intake 30 ml   Output --   Net 30 ml       Culture Results:      Ht Readings from Last 1 Encounters:   20 5' (1.524 m)        Wt Readings from Last 1 Encounters:   20 112 lb 14 oz (51.2 kg)         Estimated Creatinine Clearance: 85 mL/min (based on SCr of 0.6 mg/dL). Assessment/Plan:  Will continue Vancomycin as is for now   750 q 12 h . Thank you for the consult.

## 2020-08-03 NOTE — CARE COORDINATION
Received call from Alvaro Castaneda at 325pm who states now the home cannot have pt return, because pt will not have an rn visit every day. They are concerned about the cellulitis and would prefer the \"hole in her stomach be closed\" before she returns. Jatinder Uriostegui will contact pts jens Arizmendi at 1 [phone] to inform. Pt will need a snf for placement. Will contact guardian after Jatinder Uriostegui calls him.   Electronically signed by JOHNY House on 8/3/2020 at 3:33 PM

## 2020-08-03 NOTE — PROGRESS NOTES
7537 This RN has notified caregiver of patient discharge instructions. Caregiver/coordinator Arcenio Grullon, expresses concerns about wound healing and wound care these services are not provided by staff, MD notified of concerns at [phone]. No indications for follow up care, pt will receive home care by RN per MD order. This RN prepared pt for discharge to home at [phone]. This RN provided discharge education regarding medication regimen, and home care, to caregiver/coordinator. No LDAs present at discharge. Discontinued IV without complications. Pt. tolerated well. Brief changed, clothes changed. [phone] Patient return with home with services rejected. Will discuss with social work.
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
## 2020-08-03 NOTE — PROGRESS NOTES
Patient unable to walk and her hands are contracted; however, patient is able to move herself around. Patient trying to get out of bed. Explained to patient that she would fall and possibly hurt herself. Patient with previous TBI and there was not evidence of learning. Out of concern for patient's safety paged and got order for posey vest. This still allowed patient to move her legs and arms. Patient was able to get out of the vest initially. Since replacing the vest the patient has remained in it.

## 2020-08-03 NOTE — DISCHARGE SUMMARY
Hospital Medicine Discharge Summary    Patient ID: Roni Bates      Patient's PCP: Moustapha Bland MD    Admit Date: 7/31/2020     Discharge Date:   8/3/20    Admitting Physician: Tracey Torre DO     Discharge Physician: Raudel Jon MD     Discharge Diagnoses: Active Hospital Problems    Diagnosis Date Noted    Cellulitis [L03.90] 08/02/2020    Cellulitis, abdominal wall [V08.012] 08/01/2020    Abdominal wall cellulitis [L03.311] 08/01/2020    MARIA TERESA (acute kidney injury) ([facility] [address] [N17.9] 08/01/2020    Feeding by G-tube ([facility] [address] [Z93.1]        The patient was seen and examined on day of discharge and this discharge summary is in conjunction with any daily progress note from day of discharge. Hospital Course:   Patient is a 66-year-old female from assisted living facility has past medical history of speech and language deficit secondary to CVA, hypothyroidism, hyperlipidemia, ADHD who presented to hospital for abdominal pain. Patient is a very poor historian and has a speech deficit, most of the history is from patient's chart, ED nursing notes. Patient recently had G-tube removed, surrounding area of the J-tube appeared to be painful and red. Patient nods her head to abdominal pain and has been asking for pain medication. Patient seems to be very uncomfortable in bed. Patient denies fever. Patient was admitted from her group home after pulling out her G-tube. She developed some abdominal wall cellulitis and presented to the emergency department for IV antibiotics. She is been on IV vancomycin and Unasyn along with getting wound care. CT of the abdomen and pelvis did not show any intra-abdominal pathology. GI was consulted and recommended closure of the wound and if need be they can place another feeding tube in the future. At the moment patient is tolerating a p.o. diet without issue. Patient's hypothyroidism along with hyperlipidemia were treated with her home medications.   Patient was scheduled to go back to her group home when they stated they could not take care of the patient there due to no daily nursing care along with her open abdominal wound. A rehab facility is now being found. Patient will stay an additional day in the hospital, this will be viewed as avoidable. Exam:     /82   Pulse 121   Temp 98.5 °F (36.9 °C) (Oral)   Resp 17   Ht 5' (1.524 m)   Wt 112 lb 14 oz (51.2 kg)   SpO2 93%   BMI 22.04 kg/m²     General appearance: No apparent distress, appears stated age and cooperative. HEENT: Pupils equal, round, and reactive to light. Conjunctivae/corneas clear. Neck: Supple, with full range of motion. No jugular venous distention. Trachea midline. Respiratory:  Normal respiratory effort. Diminished breath sounds bilateral cardiovascular: Regular rate and rhythm with normal S1/S2   Abdomen: Soft, non-tender, possible contact dermatitis on abdomen  Musculoskeletal: All 4 extremities contracted  Skin: Skin color, texture, turgor normal.  No rashes or lesions. Neurologic: Moving all 4 extremities  Psychiatric: Alert       Consults:     PHARMACY TO DOSE VANCOMYCIN  IP CONSULT TO GI  IP CONSULT TO HOME CARE NEEDS    Significant Diagnostic Studies:     CT ABDOMEN PELVIS W IV CONTRAST Additional Contrast? IV   Final Result   No definite acute abnormality identified. There is a small amount of perisplenic fluid which may relate to the   ventriculoperitoneal shunt (the ventriculoperitoneal shunt terminates in the   left upper abdomen). No evidence of small bowel obstruction. XR CHEST PORTABLE   Final Result   Unremarkable portable chest radiograph. Disposition:  SNF     Condition at Discharge: Stable    Discharge Instructions/Follow-up:  PCP    Code Status:  Full Code     Activity: activity as tolerated    Diet: DIET DYSPHAGIA PUREED; Moderately Thick (Honey      Labs:  For convenience and continuity at follow-up the following most recent labs are provided:      CBC:    Lab Results   Component Value Date    WBC 7.8 07/31/2020    HGB 14.2 07/31/2020    HCT 41.6 07/31/2020     07/31/2020       Renal:    Lab Results   Component Value Date     08/01/2020    K 4.3 08/01/2020     08/01/2020    CO2 21 08/01/2020    BUN 23 08/01/2020    CREATININE 1.2 08/01/2020    CALCIUM 8.6 08/01/2020       Discharge Medications:     Current Discharge Medication List           Details   amoxicillin-clavulanate (AUGMENTIN) 875-125 MG per tablet Take 1 tablet by mouth 2 times daily for 7 days  Qty: 14 tablet, Refills: 0              Details   loratadine (CLARITIN) 10 MG tablet Take 10 mg by mouth daily      traZODone (DESYREL) 50 MG tablet Take 50 mg by mouth nightly      valproic acid (DEPAKENE) 250 MG/5ML SYRP Take 5 mLs by mouth 3 times daily  Qty: 1350 mL, Refills: 0    Comments: **Patient requests 90 days supply**      levothyroxine (SYNTHROID) 25 MCG tablet Take 1 tablet by mouth daily  Qty: 90 tablet, Refills: 0    Comments: **Patient requests 90 days supply**      menthol-zinc oxide (CALMOSEPTINE) 0.44-20.6 % OINT ointment Apply topically 2 times daily as needed Max 30 ml per day. Qty: 71 g, Refills: 0      amphetamine-dextroamphetamine (ADDERALL XR) 5 MG XR capsule Take 1 capsule by mouth Daily with lunch  Qty: 30 capsule, Refills: 0      amphetamine-dextroamphetamine (ADDERALL XR) 20 MG XR capsule Take 1 capsule by mouth every morning  Qty: 30 capsule, Refills: 0      polyethylene glycol (GLYCOLAX) powder Take 17 g by mouth daily  Qty: 1 Bottle, Refills: 2    Associated Diagnoses: [facility] ([facility])      nystatin (MYCOSTATIN) 337128 UNIT/GM cream As directed daily  Qty: 1 Tube, Refills: 1    Associated Diagnoses: Feeding by G-tube ([address]             No future appointments. Time Spent on discharge is more than 30 minutes in the examination, evaluation, counseling and review of medications and discharge plan.       Signed:    Mirtha Ash MD   8/3/2020      Thank you Akanksha Villeda MD for the opportunity to be involved in this patient's care. If you have any questions or concerns please feel free to contact me at [phone].

## 2020-08-03 NOTE — PLAN OF CARE
Problem: Skin Integrity:  Description: Patient with rash on her abdomen. Patient also has severe maceration and redness around the old SALINAS site. Nystatin cream applied  Goal: Will show no infection signs and symptoms  Description: Will show no infection signs and symptoms  8/3/2020 1445 by Nahid Bucio RN  Outcome: Completed     Problem: Skin Integrity:  Description: Patient with rash on her abdomen. Patient also has severe maceration and redness around the old SALINAS site. Nystatin cream applied  Goal: Absence of new skin breakdown  Description: Absence of new skin breakdown  8/3/2020 1445 by Nahid Bucio RN  Outcome: Completed     Problem: Falls - Risk of:  Goal: Will remain free from falls  Description: Patient needed to be put in posey vest this shift because she almost climbed out of bed several times. 8/3/2020 1445 by Nahid Bucio RN  Outcome: Completed     Problem: Pain:  Description: Pain management should include both nonpharmacologic and pharmacologic interventions. Goal: Pain level will decrease  Description: Pain level will decrease  8/3/2020 1445 by Nahid Bucio RN  Outcome: Completed     Problem: Pain:  Description: Pain management should include both nonpharmacologic and pharmacologic interventions. Goal: Control of acute pain  Description: Control of acute pain  8/3/2020 1445 by Nahid Bucio RN  Outcome: Completed     Problem: Pain:  Description: Pain management should include both nonpharmacologic and pharmacologic interventions. Goal: Control of chronic pain  Description: Control of chronic pain  8/3/2020 1445 by Nahid Bucio RN  Outcome: Completed     Problem: Restraint Use - Nonviolent/Non-Self-Destructive Behavior:  Goal: Absence of restraint indications  Description: Absence of restraint indications. Patient has a cognitive disability that makes it difficult for her to follow direction. Patient tried to get out of bed several times.  Repositioned patient each time and explained the danger of trying to get out of bed. Patient is capable of using the call light. 8/3/2020 1445 by Kendy Carrillo RN  Outcome: Completed     Problem: Restraint Use - Nonviolent/Non-Self-Destructive Behavior:  Goal: Absence of restraint-related injury  Description: Absence of restraint-related injury. Patient has no injuries related to restraints.    8/3/2020 1445 by Kendy Carrillo, RN  Outcome: Completed

## 2020-08-03 NOTE — CARE COORDINATION
Message left for guardian/nephew Suyapa Rodriguez at [phone] re snf placement. Will await return call to discuss snf choices.   Electronically signed by JOHNY House on 8/3/2020 at 3:52 PM

## 2020-08-03 NOTE — DISCHARGE INSTR - COC
Continuity of Care Form    Patient Name: Say Malik   :  1975  MRN:  [id number]    Admit date:  2020  Discharge date:  2020    Code Status Order: Full Code   Advance Directives:     Admitting Physician:  Kimi Martinez DO  PCP: Alfredo Ness MD    Discharging Nurse: Les Cortes [employer].    [address] Unit/Room#: H5H-2036/5121-84  Discharging Unit Phone Number: [phone]    Emergency Contact:   Extended Emergency Contact Information  Primary Emergency Contact: gianluca avila  Home Phone: [phone]  Relation: Niece/Nephew    Past Surgical History:  Past Surgical History:   Procedure Laterality Date    CSF SHUNT      GASTROSTOMY TUBE PLACEMENT         Immunization History:   Immunization History   Administered Date(s) Administered    Influenza 10/18/2013       Active Problems:  Patient Active Problem List   Diagnosis Code    Head injury S09.90XA    Paraparesis ([address] G82.20    Speech 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 I69.328    Dysphagia R13.10    Feeding by G-tube ([address] Z93.1    Hypothyroidism E03.9    Hyperlipidemia E78.5    Anxiety F41.9    Seizure disorder ([address] G40.909    Hydrocephalus ([address] G91.9    ADHD (attention deficit hyperactivity disorder) F90.9    Vitamin D deficiency E55.9    Cellulitis, abdominal wall L03.311    Abdominal wall cellulitis L03.311    MARIA TERESA (acute kidney injury) ([address] N17.9    Cellulitis L03.90       Isolation/Infection:   Isolation          No Isolation        Patient Infection Status     None to display          Nurse Assessment:  Last Vital Signs: BP (!) 110/57   Pulse 98   Temp 98.4 °F (36.9 °C) (Oral)   Resp 18   Ht 5' (1.524 m)   Wt 112 lb 14 oz (51.2 kg)   SpO2 96%   BMI 22.04 kg/m²     Last documented pain score (0-10 scale): Pain Level: 2  Last Weight:   Wt Readings from Last 1 Encounters:   20 112 lb 14 oz (51.2 kg)     Mental Status:  disoriented and alert    IV Access:  - None    Nursing Mobility/ADLs:  Walking   Dependent  Transfer  Dependent  Bathing  Dependent  Dressing  Dependent  Toileting  Dependent  Feeding  Dependent  Med Admin  Dependent  Med Delivery   crushed and prefers mixed with pudding    Wound Care Documentation and Therapy:  Wound 08/01/20 Abdomen Left; Lower;Medial (Active)   Wound Non-Healing Surgical 08/03/20 1113   Dressing Status Changed;Clean;Dry; Intact 08/03/20 1113   Dressing Changed Changed/New 08/03/20 1110   Dressing/Treatment Gauze dressing/ dressing sponge;Transparent film dressing 08/03/20 1110   Wound Cleansed Rinsed/Irrigated with saline 08/03/20 1110   Wound Assessment Bleeding;Drainage 08/03/20 1110   Drainage Amount Small 08/03/20 1110   Drainage Description Sanguinous; Black 08/03/20 1110   Odor None 08/03/20 0832   Jordyn-wound Assessment Maceration;Red;Painful 08/03/20 1110   Number of days: 2        Elimination:  Continence:   · Bowel: No  · Bladder: No  Urinary Catheter: None   Colostomy/Ileostomy/Ileal Conduit: No       Date of Last BM:08/03/2020    Intake/Output Summary (Last 24 hours) at 8/3/2020 1231  Last data filed at 8/3/2020 0938  Gross per 24 hour   Intake 150 ml   Output --   Net 150 ml     I/O last 3 completed shifts: In: 240 [P.O.:240]  Out: -     Safety Concerns: At Risk for Falls, History of Seizures and Aspiration Risk    Impairments/Disabilities:      Speech    Nutrition Therapy:  Current Nutrition Therapy:   - Oral Diet:  Dysphagia 1 pureed    Routes of Feeding: Oral  Liquids: Honey Thick Liquids  Daily Fluid Restriction: no  Last Modified Barium Swallow with Video (Video Swallowing Test): not done    Treatments at the Time of Hospital Discharge:   Respiratory Treatments: N/A  Oxygen Therapy:  is not on home oxygen therapy.   Ventilator:    - No ventilator support    Rehab Therapies: SN,PT,OT,HHA  Weight Bearing Status/Restrictions: No weight bearing restirctions  Other Medical Equipment (for information only, NOT a DME order):

## 2020-08-03 NOTE — PLAN OF CARE
Problem: Skin Integrity:  Goal: Will show no infection signs and symptoms  Description: Will show no infection signs and symptoms  Note: Patient has rash on abdomen. Skin around old j-tube sight and macerated and very red. Nystatin cream applied. J-tube site still bleeding. Problem: Falls - Risk of:  Goal: Will remain free from falls  Description: Patient needed to be put in posey vest this shift because she almost climbed out of bed several times. Outcome: Ongoing  Note: Patient tried to get out of bed several times this shift. Bed in low/locked position. Alarm is on and call light is within reach. Problem: Pain:  Goal: Pain level will decrease  Description: Pain level will decrease  Note: Patient unable to verbalize the quality or quantity of her pain. Patient is able to indicate that she has pain in the abdominal area. Problem: Restraint Use - Nonviolent/Non-Self-Destructive Behavior:  Goal: Absence of restraint indications  Description: Absence of restraint indications. Patient has a cognitive disability that makes it difficult for her to follow direction. Patient tried to get out of bed several times. Repositioned patient each time and explained the danger of trying to get out of bed. Patient is capable of using the call light. Outcome: Not Met This Shift  Note: Patient needed to be placed in posey this shift. Patient tried to get out of bed several times. Patient repositioned and redirected each time. Patient unable to follow directions. Goal: Absence of restraint-related injury  Description: Absence of restraint-related injury. Patient has no injuries related to restraints. Outcome: Met This Shift  Note: Patient able to get out of the posey vest when first applied. Vest reapplied and patient has not removed it.

## 2020-08-03 NOTE — CARE COORDINATION
Received dc order. I contacted pts nephew who states pt receives [facility] home care aides 24/7 and vn and they will provide transportation home at [place]. Message left with José Miguel at [phone]. Will await call back. RN contact at [facility] is [phone]. Pt resides with her grandfather in house 2 steps to enter, home address has been verified. Pt is active with [facility] who do home visits. Pt gets prescriptions at [facility]. Pt uses wheelchair and has rolling walker at home. Will await call back.   Electronically signed by JOHNY Rivera on 8/3/2020 at 12:52 PM

## 2020-08-03 NOTE — PLAN OF CARE
Problem: Skin Integrity:  Description: Patient with rash on her abdomen. Patient also has severe maceration and redness around the old SALINAS site. Nystatin cream applied  Goal: Will show no infection signs and symptoms  Description: Will show no infection signs and symptoms  8/3/2020 1324 by Noel Villeda RN  Outcome: Ongoing     Problem: Skin Integrity:  Description: Patient with rash on her abdomen. Patient also has severe maceration and redness around the old SALINAS site. Nystatin cream applied  Goal: Absence of new skin breakdown  Description: Absence of new skin breakdown  Outcome: Ongoing     Problem: Falls - Risk of:  Goal: Will remain free from falls  Description: Patient needed to be put in posey vest this shift because she almost climbed out of bed several times. 8/3/2020 1324 by Noel Villeda RN  Outcome: Ongoing     Problem: Falls - Risk of:  Goal: Absence of physical injury  Description: Absence of physical injury  Outcome: Ongoing     Problem: Pain:  Description: Pain management should include both nonpharmacologic and pharmacologic interventions. Goal: Pain level will decrease  Description: Pain level will decrease  8/3/2020 1324 by Noel Villeda RN  Outcome: Ongoing     Problem: Pain:  Description: Pain management should include both nonpharmacologic and pharmacologic interventions. Goal: Control of acute pain  Description: Control of acute pain  Outcome: Ongoing     Problem: Pain:  Description: Pain management should include both nonpharmacologic and pharmacologic interventions. Goal: Control of chronic pain  Description: Control of chronic pain  Outcome: Ongoing     Problem: Restraint Use - Nonviolent/Non-Self-Destructive Behavior:  Goal: Absence of restraint indications  Description: Absence of restraint indications. Patient has a cognitive disability that makes it difficult for her to follow direction. Patient tried to get out of bed several times.  Repositioned patient each time and explained the danger of trying to get out of bed. Patient is capable of using the call light. 8/3/2020 1324 by Daina Butterfield RN  Outcome: Ongoing     Problem: Restraint Use - Nonviolent/Non-Self-Destructive Behavior:  Goal: Absence of restraint-related injury  Description: Absence of restraint-related injury. Patient has no injuries related to restraints.    8/3/2020 1324 by Daina Butterfield RN  Outcome: Ongoing

## 2020-08-03 NOTE — PROGRESS NOTES
0845 patient received bedbath, tolerated well. Patient posey vest and siderails re-established at 0905, no signs of complications. Telecam in room. Fall precautions in place at all times. Call light always within reach. . Bed alarm on.

## 2020-08-03 NOTE — CARE COORDINATION
Replaced by [facility] aware of discharge. Demographics verified per DC planner. [facility]. Electronically signed by Ronald Mcdaniel LPN on 5/8/67 at 8:86 PM EDT

## 2020-08-03 NOTE — PLAN OF CARE
Problem: Restraint Use - Nonviolent/Non-Self-Destructive Behavior:  Goal: Absence of restraint indications  Description: Absence of restraint indications. Patient has a cognitive disability that makes it difficult for her to follow direction. Patient tried to get out of bed several times. Repositioned patient each time and explained the danger of trying to get out of bed. Patient is capable of using the call light. 8/3/2020 0544 by Kwabena Rizvi RN  Outcome: Ongoing  Note: Patient still pulling at tubing and moving around haplessly in the bed. Patient has been repositioned, redirected, and given pain medication; however she continues to pull at things tries to throw her legs over the side rail of the bed.   8/3/2020 0114 by Kwabena Rizvi RN  Outcome: Not Met This Shift  Note: Patient needed to be placed in posey this shift. Patient tried to get out of bed several times. Patient repositioned and redirected each time. Patient unable to follow directions. Goal: Absence of restraint-related injury  Description: Absence of restraint-related injury. Patient has no injuries related to restraints. 8/3/2020 0544 by Kwabena Rizvi RN  Outcome: Met This Shift  Note: Patient has no restraint-related injuries. 8/3/2020 0114 by Kwabena Rizvi RN  Outcome: Met This Shift  Note: Patient able to get out of the posey vest when first applied. Vest reapplied and patient has not removed it.

## 2020-08-04 PROCEDURE — 2580000003 HC RX 258: Performed by: INTERNAL MEDICINE

## 2020-08-04 PROCEDURE — 6370000000 HC RX 637 (ALT 250 FOR IP): Performed by: INTERNAL MEDICINE

## 2020-08-04 PROCEDURE — 6360000002 HC RX W HCPCS: Performed by: INTERNAL MEDICINE

## 2020-08-04 PROCEDURE — 1200000000 HC SEMI PRIVATE

## 2020-08-04 PROCEDURE — 94760 N-INVAS EAR/PLS OXIMETRY 1: CPT

## 2020-08-04 PROCEDURE — U0003 INFECTIOUS AGENT DETECTION BY NUCLEIC ACID (DNA OR RNA); SEVERE ACUTE RESPIRATORY SYNDROME CORONAVIRUS 2 (SARS-COV-2) (CORONAVIRUS DISEASE [COVID-19]), AMPLIFIED PROBE TECHNIQUE, MAKING USE OF HIGH THROUGHPUT TECHNOLOGIES AS DESCRIBED BY CMS-2020-01-R: HCPCS

## 2020-08-04 PROCEDURE — 2580000003 HC RX 258: Performed by: STUDENT IN AN ORGANIZED HEALTH CARE EDUCATION/TRAINING PROGRAM

## 2020-08-04 PROCEDURE — 92526 ORAL FUNCTION THERAPY: CPT

## 2020-08-04 RX ORDER — DEXTROAMPHETAMINE SACCHARATE, AMPHETAMINE ASPARTATE, DEXTROAMPHETAMINE SULFATE AND AMPHETAMINE SULFATE 5; 5; 5; 5 MG/1; MG/1; MG/1; MG/1
20 TABLET ORAL DAILY
Qty: 3 TABLET | Refills: 0 | Status: SHIPPED | OUTPATIENT
Start: 2020-08-04 | End: 2020-08-10

## 2020-08-04 RX ORDER — AMOXICILLIN AND CLAVULANATE POTASSIUM 875; 125 MG/1; MG/1
1 TABLET, FILM COATED ORAL 2 TIMES DAILY
Qty: 10 TABLET | Refills: 0 | DISCHARGE
Start: 2020-08-04 | End: 2020-08-09

## 2020-08-04 RX ORDER — AMOXICILLIN AND CLAVULANATE POTASSIUM 875; 125 MG/1; MG/1
1 TABLET, FILM COATED ORAL EVERY 12 HOURS SCHEDULED
Status: DISCONTINUED | OUTPATIENT
Start: 2020-08-04 | End: 2020-08-05 | Stop reason: HOSPADM

## 2020-08-04 RX ORDER — OXYCODONE HYDROCHLORIDE 5 MG/1
5 TABLET ORAL EVERY 6 HOURS PRN
Qty: 12 TABLET | Refills: 0 | Status: SHIPPED | OUTPATIENT
Start: 2020-08-04 | End: 2020-08-07

## 2020-08-04 RX ORDER — DEXTROAMPHETAMINE SACCHARATE, AMPHETAMINE ASPARTATE, DEXTROAMPHETAMINE SULFATE AND AMPHETAMINE SULFATE 1.25; 1.25; 1.25; 1.25 MG/1; MG/1; MG/1; MG/1
5 TABLET ORAL DAILY
Qty: 3 TABLET | Refills: 0 | Status: SHIPPED | OUTPATIENT
Start: 2020-08-04 | End: 2020-08-10

## 2020-08-04 RX ADMIN — AMOXICILLIN AND CLAVULANATE POTASSIUM 1 TABLET: 875; 125 TABLET, FILM COATED ORAL at 11:38

## 2020-08-04 RX ADMIN — VALPROIC ACID 250 MG: 250 SOLUTION ORAL at 09:45

## 2020-08-04 RX ADMIN — TRAZODONE HYDROCHLORIDE 50 MG: 50 TABLET ORAL at 21:09

## 2020-08-04 RX ADMIN — OXYCODONE 5 MG: 5 TABLET ORAL at 14:35

## 2020-08-04 RX ADMIN — AMOXICILLIN AND CLAVULANATE POTASSIUM 1 TABLET: 875; 125 TABLET, FILM COATED ORAL at 21:16

## 2020-08-04 RX ADMIN — VALPROIC ACID 250 MG: 250 SOLUTION ORAL at 14:35

## 2020-08-04 RX ADMIN — VALPROIC ACID 250 MG: 250 SOLUTION ORAL at 21:16

## 2020-08-04 RX ADMIN — POLYETHYLENE GLYCOL 3350 17 G: 17 POWDER, FOR SOLUTION ORAL at 09:36

## 2020-08-04 RX ADMIN — NYSTATIN: 100000 CREAM TOPICAL at 09:36

## 2020-08-04 RX ADMIN — NYSTATIN: 100000 CREAM TOPICAL at 22:37

## 2020-08-04 RX ADMIN — AMPICILLIN SODIUM AND SULBACTAM SODIUM 1.5 G: 1; .5 INJECTION, POWDER, FOR SOLUTION INTRAMUSCULAR; INTRAVENOUS at 01:59

## 2020-08-04 RX ADMIN — CETIRIZINE HYDROCHLORIDE 5 MG: 10 TABLET, FILM COATED ORAL at 09:35

## 2020-08-04 RX ADMIN — LEVOTHYROXINE SODIUM 25 MCG: 0.03 TABLET ORAL at 09:35

## 2020-08-04 ASSESSMENT — PAIN SCALES - GENERAL: PAINLEVEL_OUTOF10: 4

## 2020-08-04 NOTE — PROGRESS NOTES
[facility]    SLP evaluation reviewed  Patient can take po  Given the cellulitis at the G-tube site, recommend holding off on placing a new PEG tube as it may not be necessary if her po intake is adequate; also, it may be difficult to find another site away from the abdominal wall infection  Please call if there are issues necessitating an attempt at new PEG tube insertion

## 2020-08-04 NOTE — CARE COORDINATION
Spoke with pts guardian who is agreeable to,l and has chosen, [address], or [address]. Info has been sent to both. Pt must be restraint free for 24-48 hours prior to dc. Will also need rapid covid test prior to dc.   Electronically signed by JOHNY Montero on 8/4/2020 at 10:33 AM

## 2020-08-04 NOTE — PLAN OF CARE
Problem: Skin Integrity:  Goal: Will show no infection signs and symptoms  Description: Will show no infection signs and symptoms  8/3/2020 1445 by Noel Villeda RN  Outcome: Completed  Goal: Absence of new skin breakdown  Description: Absence of new skin breakdown  8/3/2020 1445 by Noel Villeda RN  Outcome: Completed     Problem: Falls - Risk of:  Goal: Will remain free from falls  Description: Patient needed to be put in posey vest this shift because she almost climbed out of bed several times. 8/3/2020 1445 by Noel Villeda RN  Outcome: Completed  Goal: Absence of physical injury  Description: Absence of physical injury  8/3/2020 1445 by Noel Villeda RN  Outcome: Completed     Problem: Pain:  Goal: Pain level will decrease  Description: Pain level will decrease  8/3/2020 1445 by Noel Villeda RN  Outcome: Completed  Goal: Control of acute pain  Description: Control of acute pain  8/3/2020 1445 by Noel Villeda RN  Outcome: Completed  Goal: Control of chronic pain  Description: Control of chronic pain  8/3/2020 1445 by Noel Villeda RN  Outcome: Completed     Problem: Restraint Use - Nonviolent/Non-Self-Destructive Behavior:  Goal: Absence of restraint indications  Description: Absence of restraint indications. Patient has a cognitive disability that makes it difficult for her to follow direction. Patient tried to get out of bed several times. Repositioned patient each time and explained the danger of trying to get out of bed. Patient is capable of using the call light. 8/3/2020 1445 by Noel Villeda RN  Outcome: Completed  Goal: Absence of restraint-related injury  Description: Absence of restraint-related injury. Patient has no injuries related to restraints.    8/3/2020 1445 by Noel Villeda RN  Outcome: Completed     Problem: Restraint Use - Nonviolent/Non-Self-Destructive Behavior:  Goal: Absence of restraint indications  Description: Absence of restraint indications  Outcome: Not Met This Shift  Note: Patient continues to try and get out of bed, throwing her legs over the side. Patient is redirectable, but quickly forgets and tries to get out of the bed again. Patient also picks at her arms and pulls out Ivs. Current IV is in right upper arm, wrapped in coban and has a sleeve over it.    Goal: Absence of restraint-related injury  Description: Absence of restraint-related injury  Outcome: Met This Shift

## 2020-08-04 NOTE — PROGRESS NOTES
Patient's IV removed in anticipation of discharge. Patient ended up staying at hospital and NP wanted new IV. Patient a very difficult stick and patient tends to remove IVs. Found good vein in right upper extremity above the elbow. Sent secure message to Lilly Robles NP asking for order. NP sent message back that she would do that. 22 gauge placed in right upper arm. Went to access patient's IV for morning vancomycin. Patient's IV infiltrated.  Sent secure message about situation to MD Malik

## 2020-08-04 NOTE — CARE COORDINATION
Pt has  Been accepted to [facility],  Will need covid test results. Hens is complete and on chart. [facility] to transport pt at 730 pending neg. covid test.  Call report to [phone]. Will cancel ambulance if covid results are not in.   Electronically signed by JOHNY Barnett on 8/4/2020 at 3:52 PM

## 2020-08-04 NOTE — DISCHARGE SUMMARY
Hospital Medicine Discharge Summary    Patient ID: Lan Levin      Patient's PCP: Whit Stockton MD    Admit Date: 7/31/2020     Discharge Date:   8/4/20    Admitting Physician: Laith Jimenez DO     Discharge Physician: Sybil Tovar MD     Discharge Diagnoses: Active Hospital Problems    Diagnosis Date Noted    Cellulitis [L03.90] 08/02/2020    Cellulitis, abdominal wall [S84.784] 08/01/2020    Abdominal wall cellulitis [L03.311] 08/01/2020    MARIA TERESA (acute kidney injury) ([facility] [N17.9] 08/01/2020    Feeding by G-tube ([facility] [Z93.1]        The patient was seen and examined on day of discharge and this discharge summary is in conjunction with any daily progress note from day of discharge. Hospital Course:   Patient is a 42-year-old female from assisted living facility has past medical history of speech and language deficit secondary to CVA, hypothyroidism, hyperlipidemia, ADHD who presented to hospital for abdominal pain. Patient is a very poor historian and has a speech deficit, most of the history is from patient's chart, ED nursing notes. Patient recently had G-tube removed, surrounding area of the J-tube appeared to be painful and red. Patient nods her head to abdominal pain and has been asking for pain medication. Patient seems to be very uncomfortable in bed. Patient denies fever. Patient was admitted from her group home after pulling out her G-tube. She developed some abdominal wall cellulitis and presented to the emergency department for IV antibiotics. She is been on IV vancomycin and Unasyn along with getting wound care. CT of the abdomen and pelvis did not show any intra-abdominal pathology. GI was consulted and recommended closure of the wound and if need be they can place another feeding tube in the future. At the moment patient is tolerating a p.o. diet without issue. Patient's hypothyroidism along with hyperlipidemia were treated with her home medications.   Patient was scheduled to go back to her group home when they stated they could not take care of the patient there due to no daily nursing care along with her open abdominal wound. A rehab facility is now being found. Patient will stay an additional day in the hospital, this will be viewed as avoidable. Exam:     /80   Pulse 81   Temp 98 °F (36.7 °C) (Oral)   Resp 16   Ht 5' (1.524 m)   Wt 111 lb 15.9 oz (50.8 kg)   SpO2 92%   BMI 21.87 kg/m²     General appearance: No apparent distress, appears stated age and cooperative. HEENT: Pupils equal, round, and reactive to light. Conjunctivae/corneas clear. Neck: Supple, with full range of motion. No jugular venous distention. Trachea midline. Respiratory:  Normal respiratory effort. Diminished breath sounds bilateral cardiovascular: Regular rate and rhythm with normal S1/S2   Abdomen: Soft, non-tender, possible contact dermatitis on abdomen  Musculoskeletal: All 4 extremities contracted  Skin: Skin color, texture, turgor normal.  No rashes or lesions. Neurologic: Moving all 4 extremities  Psychiatric: Alert       Consults:     PHARMACY TO DOSE VANCOMYCIN  IP CONSULT TO GI  IP CONSULT TO HOME CARE NEEDS    Significant Diagnostic Studies:     CT ABDOMEN PELVIS W IV CONTRAST Additional Contrast? IV   Final Result   No definite acute abnormality identified. There is a small amount of perisplenic fluid which may relate to the   ventriculoperitoneal shunt (the ventriculoperitoneal shunt terminates in the   left upper abdomen). No evidence of small bowel obstruction. XR CHEST PORTABLE   Final Result   Unremarkable portable chest radiograph. Disposition:  SNF     Condition at Discharge: Stable    Discharge Instructions/Follow-up:  PCP    Code Status:  Full Code     Activity: activity as tolerated    Diet: DIET DYSPHAGIA PUREED; Moderately Thick (Honey      Labs:  For convenience and continuity at follow-up the following most recent labs
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
## 2020-08-05 VITALS
OXYGEN SATURATION: 91 % | HEIGHT: 60 IN | SYSTOLIC BLOOD PRESSURE: 121 MMHG | BODY MASS INDEX: 21.99 KG/M2 | WEIGHT: 111.99 LBS | HEART RATE: 125 BPM | TEMPERATURE: 98.4 F | DIASTOLIC BLOOD PRESSURE: 80 MMHG | RESPIRATION RATE: 16 BRPM

## 2020-08-05 LAB — SARS-COV-2, NAAT: NOT DETECTED

## 2020-08-05 PROCEDURE — 92526 ORAL FUNCTION THERAPY: CPT

## 2020-08-05 PROCEDURE — 6370000000 HC RX 637 (ALT 250 FOR IP): Performed by: INTERNAL MEDICINE

## 2020-08-05 PROCEDURE — 94760 N-INVAS EAR/PLS OXIMETRY 1: CPT

## 2020-08-05 PROCEDURE — U0002 COVID-19 LAB TEST NON-CDC: HCPCS

## 2020-08-05 RX ADMIN — NYSTATIN: 100000 CREAM TOPICAL at 10:01

## 2020-08-05 RX ADMIN — DEXTROAMPHETAMINE SACCHARATE, AMPHETAMINE ASPARTATE MONOHYDRATE, DEXTROAMPHETAMINE SULFATE, AND AMPHETAMINE SULFATE 20 MG: 2.5; 2.5; 2.5; 2.5 CAPSULE, EXTENDED RELEASE ORAL at 10:01

## 2020-08-05 RX ADMIN — OXYCODONE 5 MG: 5 TABLET ORAL at 15:19

## 2020-08-05 RX ADMIN — POLYETHYLENE GLYCOL 3350 17 G: 17 POWDER, FOR SOLUTION ORAL at 10:01

## 2020-08-05 RX ADMIN — LEVOTHYROXINE SODIUM 25 MCG: 0.03 TABLET ORAL at 10:01

## 2020-08-05 RX ADMIN — VALPROIC ACID 250 MG: 250 SOLUTION ORAL at 10:02

## 2020-08-05 RX ADMIN — CETIRIZINE HYDROCHLORIDE 5 MG: 10 TABLET, FILM COATED ORAL at 10:01

## 2020-08-05 RX ADMIN — VALPROIC ACID 250 MG: 250 SOLUTION ORAL at 15:01

## 2020-08-05 RX ADMIN — AMOXICILLIN AND CLAVULANATE POTASSIUM 1 TABLET: 875; 125 TABLET, FILM COATED ORAL at 10:01

## 2020-08-05 ASSESSMENT — PAIN DESCRIPTION - PROGRESSION: CLINICAL_PROGRESSION: NOT CHANGED

## 2020-08-05 ASSESSMENT — PAIN DESCRIPTION - LOCATION: LOCATION: ABDOMEN

## 2020-08-05 ASSESSMENT — PAIN DESCRIPTION - PAIN TYPE: TYPE: ACUTE PAIN

## 2020-08-05 ASSESSMENT — PAIN DESCRIPTION - ONSET: ONSET: GRADUAL

## 2020-08-05 ASSESSMENT — PAIN SCALES - GENERAL
PAINLEVEL_OUTOF10: 4
PAINLEVEL_OUTOF10: 1

## 2020-08-05 ASSESSMENT — PAIN DESCRIPTION - FREQUENCY: FREQUENCY: INTERMITTENT

## 2020-08-05 ASSESSMENT — PAIN DESCRIPTION - ORIENTATION: ORIENTATION: LEFT;OTHER (COMMENT)

## 2020-08-05 ASSESSMENT — PAIN DESCRIPTION - DESCRIPTORS: DESCRIPTORS: ACHING;SORE

## 2020-08-05 NOTE — PROGRESS NOTES
Patient is alert to person, attempt to re-orient to place and time. Patient able to follow commands with encouragement. Takes scheduled medications crushed in applesauce. Tolerates fairly well. VS stable. Denies pain. Dressing dry and intact via left abdomen. Call light in reach. Bed alarm active.
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
## 2020-08-05 NOTE — CARE COORDINATION
CASE MANAGEMENT DISCHARGE SUMMARY:    DISCHARGE DATE: 08/05/2020    DISCHARGED TO: [facility]               REPORT NUMBER: [id number]             FAX NUMBER: [phone]    TRANSPORTATION: [facility]             TIME: 630 pm     PREFERRED PHARMACY: at facility    INSURANCE PRECERT OBTAINED: N/A    SHELBY completed    Nurse, patient's nephew, and Talya Blackmon at [facility] notified of transportation time.     Electronically signed by Lyssa Bee RN on 8/5/2020 at 4:34 PM

## 2020-08-05 NOTE — DISCHARGE SUMMARY
Hospital Medicine Discharge Summary    Patient ID: Supriya [facility]      Patient's PCP: Gary Contreras MD    Admit Date: 7/31/2020     Discharge Date:   8/5/20    Admitting Physician: Trey Ramirez DO     Discharge Physician: Brendan Vee MD     Discharge Diagnoses: Active Hospital Problems    Diagnosis Date Noted    Cellulitis [L03.90] 08/02/2020    Cellulitis, abdominal wall [W17.283] 08/01/2020    Abdominal wall cellulitis [L03.311] 08/01/2020    MARIA TERESA (acute kidney injury) ([facility] [address] [N17.9] 08/01/2020    Feeding by G-tube ([facility] [address] [Z93.1]        The patient was seen and examined on day of discharge and this discharge summary is in conjunction with any daily progress note from day of discharge. Hospital Course:   Patient is a 59-year-old female from assisted living facility has past medical history of speech and language deficit secondary to CVA, hypothyroidism, hyperlipidemia, ADHD who presented to hospital for abdominal pain. Patient is a very poor historian and has a speech deficit, most of the history is from patient's chart, ED nursing notes. Patient recently had G-tube removed, surrounding area of the J-tube appeared to be painful and red. Patient nods her head to abdominal pain and has been asking for pain medication. Patient seems to be very uncomfortable in bed. Patient denies fever. Patient was admitted from her group home after pulling out her G-tube. She developed some abdominal wall cellulitis and presented to the emergency department for IV antibiotics. She is been on IV vancomycin and Unasyn along with getting wound care. CT of the abdomen and pelvis did not show any intra-abdominal pathology. GI was consulted and recommended closure of the wound and if need be they can place another feeding tube in the future. At the moment patient is tolerating a p.o. diet without issue. Patient's hypothyroidism along with hyperlipidemia were treated with her home medications.   Patient was scheduled to go back to her group home when they stated they could not take care of the patient there due to no daily nursing care along with her open abdominal wound. A rehab facility is now being found. Patient will stay an additional day in the hospital, this will be viewed as avoidable. Exam:     /74   Pulse 111   Temp 99.1 °F (37.3 °C) (Oral)   Resp 16   Ht 5' (1.524 m)   Wt 111 lb 15.9 oz (50.8 kg)   SpO2 92%   BMI 21.87 kg/m²     General appearance: No apparent distress, appears stated age and cooperative. HEENT: Pupils equal, round, and reactive to light. Conjunctivae/corneas clear. Neck: Supple, with full range of motion. No jugular venous distention. Trachea midline. Respiratory:  Normal respiratory effort. Diminished breath sounds bilateral cardiovascular: Regular rate and rhythm with normal S1/S2   Abdomen: Soft, non-tender, possible contact dermatitis on abdomen  Musculoskeletal: All 4 extremities contracted  Skin: Skin color, texture, turgor normal.  No rashes or lesions. Neurologic: Moving all 4 extremities  Psychiatric: Alert       Consults:     IP CONSULT TO GI  IP CONSULT TO HOME CARE NEEDS    Significant Diagnostic Studies:     CT ABDOMEN PELVIS W IV CONTRAST Additional Contrast? IV   Final Result   No definite acute abnormality identified. There is a small amount of perisplenic fluid which may relate to the   ventriculoperitoneal shunt (the ventriculoperitoneal shunt terminates in the   left upper abdomen). No evidence of small bowel obstruction. XR CHEST PORTABLE   Final Result   Unremarkable portable chest radiograph. Disposition:  SNF     Condition at Discharge: Stable    Discharge Instructions/Follow-up:  PCP    Code Status:  Full Code     Activity: activity as tolerated    Diet: DIET DYSPHAGIA PUREED; Moderately Thick (Honey      Labs:  For convenience and continuity at follow-up the following most recent labs are provided:      CBC: 17 g by mouth daily  Qty: 1 Bottle, Refills: 2    Associated Diagnoses: Feeding by G-tube ([facility])      nystatin (MYCOSTATIN) [id number] UNIT/GM cream As directed daily  Qty: 1 Tube, Refills: 1    Associated Diagnoses: Feeding by G-tube ([address]             No future appointments. Time Spent on discharge is more than 30 minutes in the examination, evaluation, counseling and review of medications and discharge plan. Signed:    Linn Adler MD   8/5/2020      Thank you Yasmani Sterling MD for the opportunity to be involved in this patient's care. If you have any questions or concerns please feel free to contact me at [phone].

## 2020-08-05 NOTE — CARE COORDINATION
Left message for patient's nephew, Nate Good, to review IMM. Waiting for Covid results for D/C to [place] [address] [address]. Electronically signed by Pavel Garcia RN on 8/5/2020 at 11:53 AM     Spoke with Isabella Roman at [place] [address] [address] [facility], she advised they can accept the patient without the 24 hr wait for restraints (has been out of restraints since 1600 on 08/04/2020). Covid result is pending. Electronically signed by Pavel Garcia RN on 8/5/2020 at 12:28 PM     Call to [facility], they state they have not received the Covid test to be ran. Spoke with the patient's nurse/charge nurse - a rapid covid will be ordered so the patient can D/C today. Electronically signed by Pavel Garcia RN on 8/5/2020 at 2:46 PM     Rapid Covid -.   Spoke with Isabella Roman at [place] [address] [address], patient is good for transport at 6:30 pm.      Electronically signed by Pavel Garcia RN on 8/5/2020 at 4:33 PM

## 2020-08-07 LAB
REPORT: NORMAL
SARS-COV-2: NOT DETECTED
THIS TEST SENT TO: NORMAL

## 2020-08-10 ENCOUNTER — HOSPITAL ENCOUNTER (INPATIENT)
Age: 45
LOS: 6 days | Discharge: NURSING, REHAB OR LONG-TERM CARE | DRG: 919 | End: 2020-08-16
Attending: STUDENT IN AN ORGANIZED HEALTH CARE EDUCATION/TRAINING PROGRAM | Admitting: STUDENT IN AN ORGANIZED HEALTH CARE EDUCATION/TRAINING PROGRAM
Payer: MEDICARE

## 2020-08-10 PROBLEM — T85.598A FEEDING TUBE DYSFUNCTION: Status: ACTIVE | Noted: 2020-08-10

## 2020-08-10 LAB
ANION GAP SERPL CALCULATED.3IONS-SCNC: 11 MMOL/L (ref 3–16)
BASOPHILS ABSOLUTE: 0.1 K/UL (ref 0–0.2)
BASOPHILS RELATIVE PERCENT: 1.3 %
BILIRUBIN URINE: NEGATIVE
BLOOD, URINE: NEGATIVE
BUN BLDV-MCNC: 13 MG/DL (ref 7–20)
CALCIUM SERPL-MCNC: 9.1 MG/DL (ref 8.3–10.6)
CHLORIDE BLD-SCNC: 105 MMOL/L (ref 99–110)
CLARITY: CLEAR
CO2: 23 MMOL/L (ref 21–32)
COLOR: YELLOW
CREAT SERPL-MCNC: 0.7 MG/DL (ref 0.6–1.1)
EOSINOPHILS ABSOLUTE: 0.2 K/UL (ref 0–0.6)
EOSINOPHILS RELATIVE PERCENT: 2.8 %
GFR AFRICAN AMERICAN: >60
GFR NON-AFRICAN AMERICAN: >60
GLUCOSE BLD-MCNC: 91 MG/DL (ref 70–99)
GLUCOSE URINE: NEGATIVE MG/DL
HCT VFR BLD CALC: 44.9 % (ref 36–48)
HEMOGLOBIN: 14.9 G/DL (ref 12–16)
KETONES, URINE: NEGATIVE MG/DL
LEUKOCYTE ESTERASE, URINE: NEGATIVE
LYMPHOCYTES ABSOLUTE: 2 K/UL (ref 1–5.1)
LYMPHOCYTES RELATIVE PERCENT: 36.6 %
MCH RBC QN AUTO: 30.9 PG (ref 26–34)
MCHC RBC AUTO-ENTMCNC: 33.2 G/DL (ref 31–36)
MCV RBC AUTO: 93.1 FL (ref 80–100)
MICROSCOPIC EXAMINATION: NORMAL
MONOCYTES ABSOLUTE: 0.5 K/UL (ref 0–1.3)
MONOCYTES RELATIVE PERCENT: 9.3 %
NEUTROPHILS ABSOLUTE: 2.7 K/UL (ref 1.7–7.7)
NEUTROPHILS RELATIVE PERCENT: 50 %
NITRITE, URINE: NEGATIVE
PDW BLD-RTO: 13.1 % (ref 12.4–15.4)
PH UA: 7.5 (ref 5–8)
PLATELET # BLD: 238 K/UL (ref 135–450)
PMV BLD AUTO: 9 FL (ref 5–10.5)
POTASSIUM REFLEX MAGNESIUM: 5.2 MMOL/L (ref 3.5–5.1)
PROTEIN UA: NEGATIVE MG/DL
RBC # BLD: 4.82 M/UL (ref 4–5.2)
SODIUM BLD-SCNC: 139 MMOL/L (ref 136–145)
SPECIFIC GRAVITY UA: 1.02 (ref 1–1.03)
URINE REFLEX TO CULTURE: NORMAL
URINE TYPE: NORMAL
UROBILINOGEN, URINE: 1 E.U./DL
WBC # BLD: 5.5 K/UL (ref 4–11)

## 2020-08-10 PROCEDURE — 1200000000 HC SEMI PRIVATE

## 2020-08-10 PROCEDURE — 2580000003 HC RX 258: Performed by: PHYSICIAN ASSISTANT

## 2020-08-10 PROCEDURE — 36415 COLL VENOUS BLD VENIPUNCTURE: CPT

## 2020-08-10 PROCEDURE — 96374 THER/PROPH/DIAG INJ IV PUSH: CPT

## 2020-08-10 PROCEDURE — 85025 COMPLETE CBC W/AUTO DIFF WBC: CPT

## 2020-08-10 PROCEDURE — 94760 N-INVAS EAR/PLS OXIMETRY 1: CPT

## 2020-08-10 PROCEDURE — 81003 URINALYSIS AUTO W/O SCOPE: CPT

## 2020-08-10 PROCEDURE — 80048 BASIC METABOLIC PNL TOTAL CA: CPT

## 2020-08-10 PROCEDURE — 99284 EMERGENCY DEPT VISIT MOD MDM: CPT

## 2020-08-10 PROCEDURE — 6360000002 HC RX W HCPCS: Performed by: PHYSICIAN ASSISTANT

## 2020-08-10 PROCEDURE — U0003 INFECTIOUS AGENT DETECTION BY NUCLEIC ACID (DNA OR RNA); SEVERE ACUTE RESPIRATORY SYNDROME CORONAVIRUS 2 (SARS-COV-2) (CORONAVIRUS DISEASE [COVID-19]), AMPLIFIED PROBE TECHNIQUE, MAKING USE OF HIGH THROUGHPUT TECHNOLOGIES AS DESCRIBED BY CMS-2020-01-R: HCPCS

## 2020-08-10 RX ORDER — LORAZEPAM 2 MG/ML
1 INJECTION INTRAMUSCULAR ONCE
Status: COMPLETED | OUTPATIENT
Start: 2020-08-10 | End: 2020-08-10

## 2020-08-10 RX ORDER — 0.9 % SODIUM CHLORIDE 0.9 %
1000 INTRAVENOUS SOLUTION INTRAVENOUS ONCE
Status: COMPLETED | OUTPATIENT
Start: 2020-08-10 | End: 2020-08-10

## 2020-08-10 RX ORDER — DEXTROAMPHETAMINE SACCHARATE, AMPHETAMINE ASPARTATE, DEXTROAMPHETAMINE SULFATE AND AMPHETAMINE SULFATE 5; 5; 5; 5 MG/1; MG/1; MG/1; MG/1
20 TABLET ORAL EVERY MORNING
COMMUNITY

## 2020-08-10 RX ORDER — SODIUM PHOSPHATE, DIBASIC AND SODIUM PHOSPHATE, MONOBASIC 7; 19 G/133ML; G/133ML
1 ENEMA RECTAL DAILY PRN
COMMUNITY

## 2020-08-10 RX ORDER — BISACODYL 10 MG
10 SUPPOSITORY, RECTAL RECTAL DAILY PRN
COMMUNITY

## 2020-08-10 RX ORDER — SODIUM CHLORIDE 0.9 % (FLUSH) 0.9 %
10 SYRINGE (ML) INJECTION EVERY 12 HOURS SCHEDULED
Status: DISCONTINUED | OUTPATIENT
Start: 2020-08-10 | End: 2020-08-16 | Stop reason: HOSPADM

## 2020-08-10 RX ORDER — DEXTROAMPHETAMINE SACCHARATE, AMPHETAMINE ASPARTATE, DEXTROAMPHETAMINE SULFATE AND AMPHETAMINE SULFATE 1.25; 1.25; 1.25; 1.25 MG/1; MG/1; MG/1; MG/1
5 TABLET ORAL
COMMUNITY

## 2020-08-10 RX ORDER — SODIUM CHLORIDE 0.9 % (FLUSH) 0.9 %
10 SYRINGE (ML) INJECTION PRN
Status: DISCONTINUED | OUTPATIENT
Start: 2020-08-10 | End: 2020-08-16 | Stop reason: HOSPADM

## 2020-08-10 RX ORDER — ONDANSETRON 2 MG/ML
4 INJECTION INTRAMUSCULAR; INTRAVENOUS EVERY 6 HOURS PRN
Status: DISCONTINUED | OUTPATIENT
Start: 2020-08-10 | End: 2020-08-16 | Stop reason: HOSPADM

## 2020-08-10 RX ADMIN — LORAZEPAM 1 MG: 2 INJECTION INTRAMUSCULAR; INTRAVENOUS at 21:36

## 2020-08-10 RX ADMIN — SODIUM CHLORIDE 1000 ML: 9 INJECTION, SOLUTION INTRAVENOUS at 18:00

## 2020-08-10 ASSESSMENT — PAIN DESCRIPTION - PROGRESSION: CLINICAL_PROGRESSION: NOT CHANGED

## 2020-08-10 ASSESSMENT — PAIN DESCRIPTION - LOCATION: LOCATION: ABDOMEN

## 2020-08-10 ASSESSMENT — ENCOUNTER SYMPTOMS
COLOR CHANGE: 1
VOMITING: 0

## 2020-08-10 ASSESSMENT — PAIN DESCRIPTION - PAIN TYPE: TYPE: ACUTE PAIN

## 2020-08-10 ASSESSMENT — PAIN SCALES - GENERAL: PAINLEVEL_OUTOF10: 1

## 2020-08-10 ASSESSMENT — PAIN DESCRIPTION - ORIENTATION: ORIENTATION: LEFT

## 2020-08-10 NOTE — ED NOTES
Pt to be admitted for further testing and evaluation. Pt updated on POC. All questions answered.      Reji Wheatley RN  08/10/20 [id number]

## 2020-08-10 NOTE — PROGRESS NOTES
Pharmacy Medication Reconciliation Note     List of medications patient is currently taking is complete. Source of information:   1. Patient's medication list from [facility]  2. EMR    Notes regarding home medications:   1. Called nursing home to confirm that pt received morning and afternoon home medication doses - phone goes to [facility].       [id number] [facility], Pharmacy Intern  8/10/2020 7:49 PM

## 2020-08-10 NOTE — ED PROVIDER NOTES
[facility]      Pt Name: Maria De Jesus Reese  MRN: [id number]  [place] 1975  Date of evaluation: 8/10/2020  Provider: ROSALINO Coker    This patient was not seen and evaluated by the attending physician No att. providers found. CHIEF COMPLAINT       Chief Complaint   Patient presents with    G Tube Complications     was removed due to cellulitis per NH and left out because pt was taken adequate po food/liquid. GT site is draining stomach acid/fluid       CRITICAL CARE TIME   I performed a total Critical Care time of  15 minutes, excluding separately reportable procedures. There was a high probability of clinically significant/life threatening deterioration in the patient's condition which required my urgent intervention. Not limited to multiple reexaminations, discussions with attending physician and consultants. HISTORY OF PRESENT ILLNESS  (Location/Symptom, Timing/Onset, Context/Setting, Quality, Duration, Modifying Factors, Severity.)   Maria De Jesus Reese is a 39 y.o. female who presents to the emergency department via EMS from the nursing home. She was sent for evaluation regarding leaking from prior G-tube site. Past history includes that the patient who has had a stroke with a significant speech deficit was living in a group home until the end of July when she was admitted to our hospital after her G-tube was pulled out and she developed a cellulitis around the site. She had gotten antibiotics fluids and was discharged to a skilled nursing facility. They have noted that anything she takes by mouth started to leak out of the site where the G-tube was previously. Per caretakers from her group home she had not been using the G-tube for about a year and GI had felt that her p.o. intake was sufficient for the tube to be left out at this time. The patient currently does not endorse pain.   He denies vomiting fevers or diarrhea. Nursing Notes were reviewed and I agree. REVIEW OF SYSTEMS    (2-9 systems for level 4, 10 or more for level 5)     Review of Systems   Constitutional: Negative for fever. Gastrointestinal: Negative for vomiting. Skin: Positive for color change, rash and wound. Neurological: Positive for speech difficulty (baseline). Psychiatric/Behavioral:        Baseline       Unable to perform complete review of systems due to the patient's baseline mental status. PAST MEDICAL HISTORY         Diagnosis Date    ADHD (attention deficit hyperactivity disorder)     MARIA TERESA [facility]acute kidney [facility]) ([facility] 8/1/2020    Anxiety     Dysphagia     1984    Feeding by G-tube ([facility]     1984    Head injury     1984    Hydrocephalus ([facility]     S/P SHUNT 1984    Hyperlipidemia     Hypothyroidism     Paraparesis ([facility]     1984    Seizure disorder ([facility]     Seizure disorder ([facility]     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 AMPHETAMINE-DEXTROAMPHETAMINE (ADDERALL) 5 MG TABLET    Take 5 mg by mouth Daily with lunch. BISACODYL (DULCOLAX) 10 MG SUPPOSITORY    Place 10 mg rectally daily as needed for Constipation    LEVOTHYROXINE (SYNTHROID) 25 MCG TABLET    Take 1 tablet by mouth daily    LORATADINE (CLARITIN) 10 MG TABLET    Take 10 mg by mouth daily    MAGNESIUM HYDROXIDE (MILK OF MAGNESIA) 400 MG/5ML SUSPENSION    Take 30 mLs by mouth daily as needed for Constipation    MENTHOL-ZINC OXIDE (CALMOSEPTINE) 0.44-20.6 % OINT OINTMENT    Apply topically 2 times daily as needed Max 30 ml per day.     NYSTATIN (MYCOSTATIN) 414294 UNIT/GM CREAM    As directed daily    POLYETHYLENE GLYCOL (GLYCOLAX) POWDER Take 17 g by mouth daily    SODIUM PHOSPHATES (FLEET) 7-19 GM/118ML    Place 1 enema rectally daily as needed    TRAZODONE (DESYREL) 50 MG TABLET    Take 50 mg by mouth nightly    VALPROIC ACID (DEPAKENE) 250 MG/5ML SYRP    Take 5 mLs by mouth 3 times daily       ALLERGIES     Phenytoin sodium extended    FAMILY HISTORY     History reviewed. No pertinent family history. No family status information on file. SOCIAL HISTORY      reports that she has never smoked. She has never used smokeless tobacco. She reports that she does not drink alcohol or use drugs. PHYSICAL EXAM    (up to 7 for level 4, 8 or more for level 5)     ED Triage Vitals   BP Temp Temp Source Pulse Resp SpO2 Height Weight   08/10/20 1616 08/10/20 1707 08/10/20 1707 08/10/20 1616 08/10/20 1616 08/10/20 1616 08/10/20 1616 08/10/20 1616   114/81 98.7 °F (37.1 °C) Oral 104 14 91 % 5' 2\" (1.575 m) 111 lb 8.8 oz (50.6 kg)       Physical Exam  Vitals signs reviewed. Cardiovascular:      Rate and Rhythm: Tachycardia present. Pulmonary:      Effort: Pulmonary effort is normal.   Abdominal:      Tenderness: There is no abdominal tenderness. Skin:     General: Skin is warm. Findings: Erythema present. Neurological:      Mental Status: She is alert. Psychiatric:         Cognition and Memory: Cognition is impaired.          DIAGNOSTIC RESULTS     NONE    LABS:  Labs Reviewed   BASIC METABOLIC PANEL W/ REFLEX TO MG FOR LOW K - Abnormal; Notable for the following components:       Result Value    Potassium reflex Magnesium 5.2 (*)     All other components within normal limits    Narrative:     Performed at:  [facility]  [address] [place], [place] [address]   Phone [phone]   CBC WITH AUTO DIFFERENTIAL    Narrative:     Performed at:  [facility]  [address] [place], [place] [address]   Phone [phone]       All other labs were within normal range or not returned as of this dictation. EMERGENCY DEPARTMENT COURSE and DIFFERENTIAL DIAGNOSIS/MDM:   Vitals:    Vitals:    08/10/20 1616 08/10/20 1707 08/10/20 1709 08/10/20 1842   BP: 114/81 104/82 104/82 104/82   Pulse: 104   98   Resp: 14 16     Temp:  98.7 °F (37.1 °C)  98.6 °F (37 °C)   TempSrc:  Oral  Oral   SpO2: 91% 97% 92%    Weight: 111 lb 8.8 oz (50.6 kg)      Height: 5' 2\" (1.575 m)        Patient is afebrile not hypoxic. Resting comfortably her belly is soft. The skin around the prior G-tube is very friable with a hole is not big enough for me to insert a tube in it currently. There are some stomach contents leaking from the site. Initially contacted GI on call and then the GI doctor who evaluated her in the hospital who recommended I contact her GI doctor [facility] regarding recommendations. He has discussed the patient's care with the nursing home. She is apparently not doing well with p.o. intake and the site has not been doing well with wound care because it continues to leak. Felt the best decision would be to keep the patient in the hospital for IV fluids and consult general surgery regarding a J-tube placement. I did contact the hospitalist who agreed he asked me to contact general surgery spoke with Dr. Ed Carpenter. The patient was updated on the plan. CONSULTS:  IP CONSULT TO GI  IP CONSULT TO GI  IP CONSULT TO GI  IP CONSULT TO HOSPITALIST  IP CONSULT TO GENERAL SURGERY    PROCEDURES:  Procedures      FINAL IMPRESSION      1. Open wound of abdominal wall, initial encounter    2. Difficulty eating    3. Tachycardia          DISPOSITION/PLAN   DISPOSITION        PATIENT REFERRED TO:  No follow-up provider specified.     DISCHARGE MEDICATIONS:  New Prescriptions    No medications on file       (Please note that portions of this note were completed with a voice recognition program.  Efforts were made to edit the dictations but occasionally words are mis-transcribed.)    ROSALINO Wilson [place]  08/10/20 1946

## 2020-08-10 NOTE — ED TRIAGE NOTES
Patient here from [address] [place] [address] from [facility]. Patient here for Gtube leaking.

## 2020-08-11 LAB
A/G RATIO: 1.3 (ref 1.1–2.2)
ALBUMIN SERPL-MCNC: 3.6 G/DL (ref 3.4–5)
ALP BLD-CCNC: 41 U/L (ref 40–129)
ALT SERPL-CCNC: 25 U/L (ref 10–40)
ANION GAP SERPL CALCULATED.3IONS-SCNC: 11 MMOL/L (ref 3–16)
AST SERPL-CCNC: 16 U/L (ref 15–37)
BASOPHILS ABSOLUTE: 0.1 K/UL (ref 0–0.2)
BASOPHILS RELATIVE PERCENT: 1.4 %
BILIRUB SERPL-MCNC: 0.3 MG/DL (ref 0–1)
BUN BLDV-MCNC: 8 MG/DL (ref 7–20)
CALCIUM SERPL-MCNC: 8.6 MG/DL (ref 8.3–10.6)
CHLORIDE BLD-SCNC: 106 MMOL/L (ref 99–110)
CO2: 22 MMOL/L (ref 21–32)
CREAT SERPL-MCNC: 0.5 MG/DL (ref 0.6–1.1)
EOSINOPHILS ABSOLUTE: 0.2 K/UL (ref 0–0.6)
EOSINOPHILS RELATIVE PERCENT: 3.5 %
GFR AFRICAN AMERICAN: >60
GFR NON-AFRICAN AMERICAN: >60
GLOBULIN: 2.7 G/DL
GLUCOSE BLD-MCNC: 85 MG/DL (ref 70–99)
HCT VFR BLD CALC: 43.2 % (ref 36–48)
HEMOGLOBIN: 14.3 G/DL (ref 12–16)
LYMPHOCYTES ABSOLUTE: 2.3 K/UL (ref 1–5.1)
LYMPHOCYTES RELATIVE PERCENT: 50.1 %
MCH RBC QN AUTO: 30.9 PG (ref 26–34)
MCHC RBC AUTO-ENTMCNC: 33.1 G/DL (ref 31–36)
MCV RBC AUTO: 93.4 FL (ref 80–100)
MONOCYTES ABSOLUTE: 0.4 K/UL (ref 0–1.3)
MONOCYTES RELATIVE PERCENT: 9.3 %
NEUTROPHILS ABSOLUTE: 1.7 K/UL (ref 1.7–7.7)
NEUTROPHILS RELATIVE PERCENT: 35.7 %
PDW BLD-RTO: 13.5 % (ref 12.4–15.4)
PLATELET # BLD: 183 K/UL (ref 135–450)
PMV BLD AUTO: 9.5 FL (ref 5–10.5)
POTASSIUM REFLEX MAGNESIUM: 3.8 MMOL/L (ref 3.5–5.1)
RBC # BLD: 4.63 M/UL (ref 4–5.2)
SARS-COV-2, NAA: NOT DETECTED
SARS-COV-2, NAAT: NOT DETECTED
SODIUM BLD-SCNC: 139 MMOL/L (ref 136–145)
TOTAL PROTEIN: 6.3 G/DL (ref 6.4–8.2)
WBC # BLD: 4.6 K/UL (ref 4–11)

## 2020-08-11 PROCEDURE — 99222 1ST HOSP IP/OBS MODERATE 55: CPT | Performed by: SURGERY

## 2020-08-11 PROCEDURE — 85025 COMPLETE CBC W/AUTO DIFF WBC: CPT

## 2020-08-11 PROCEDURE — U0002 COVID-19 LAB TEST NON-CDC: HCPCS

## 2020-08-11 PROCEDURE — 6370000000 HC RX 637 (ALT 250 FOR IP): Performed by: NURSE PRACTITIONER

## 2020-08-11 PROCEDURE — 94761 N-INVAS EAR/PLS OXIMETRY MLT: CPT

## 2020-08-11 PROCEDURE — 2580000003 HC RX 258: Performed by: STUDENT IN AN ORGANIZED HEALTH CARE EDUCATION/TRAINING PROGRAM

## 2020-08-11 PROCEDURE — APPNB180 APP NON BILLABLE TIME > 60 MINS: Performed by: PHYSICIAN ASSISTANT

## 2020-08-11 PROCEDURE — 1200000000 HC SEMI PRIVATE

## 2020-08-11 PROCEDURE — 36415 COLL VENOUS BLD VENIPUNCTURE: CPT

## 2020-08-11 PROCEDURE — 80053 COMPREHEN METABOLIC PANEL: CPT

## 2020-08-11 PROCEDURE — APPSS180 APP SPLIT SHARED TIME > 60 MINUTES: Performed by: PHYSICIAN ASSISTANT

## 2020-08-11 RX ORDER — LEVOTHYROXINE SODIUM 0.03 MG/1
25 TABLET ORAL DAILY
Status: DISCONTINUED | OUTPATIENT
Start: 2020-08-11 | End: 2020-08-16 | Stop reason: HOSPADM

## 2020-08-11 RX ORDER — DEXTROAMPHETAMINE SACCHARATE, AMPHETAMINE ASPARTATE, DEXTROAMPHETAMINE SULFATE AND AMPHETAMINE SULFATE 2.5; 2.5; 2.5; 2.5 MG/1; MG/1; MG/1; MG/1
5 TABLET ORAL
Status: DISCONTINUED | OUTPATIENT
Start: 2020-08-11 | End: 2020-08-16 | Stop reason: HOSPADM

## 2020-08-11 RX ORDER — VALPROIC ACID 250 MG/5ML
250 SOLUTION ORAL 3 TIMES DAILY
Status: DISCONTINUED | OUTPATIENT
Start: 2020-08-11 | End: 2020-08-16 | Stop reason: HOSPADM

## 2020-08-11 RX ORDER — TRAZODONE HYDROCHLORIDE 50 MG/1
50 TABLET ORAL NIGHTLY
Status: DISCONTINUED | OUTPATIENT
Start: 2020-08-11 | End: 2020-08-16 | Stop reason: HOSPADM

## 2020-08-11 RX ORDER — DEXTROAMPHETAMINE SACCHARATE, AMPHETAMINE ASPARTATE, DEXTROAMPHETAMINE SULFATE AND AMPHETAMINE SULFATE 2.5; 2.5; 2.5; 2.5 MG/1; MG/1; MG/1; MG/1
20 TABLET ORAL EVERY MORNING
Status: DISCONTINUED | OUTPATIENT
Start: 2020-08-11 | End: 2020-08-16 | Stop reason: HOSPADM

## 2020-08-11 RX ADMIN — SODIUM CHLORIDE, PRESERVATIVE FREE 10 ML: 5 INJECTION INTRAVENOUS at 00:20

## 2020-08-11 RX ADMIN — SODIUM CHLORIDE, PRESERVATIVE FREE 10 ML: 5 INJECTION INTRAVENOUS at 20:55

## 2020-08-11 RX ADMIN — LEVOTHYROXINE SODIUM 25 MCG: 0.03 TABLET ORAL at 09:18

## 2020-08-11 RX ADMIN — VALPROIC ACID 250 MG: 250 SOLUTION ORAL at 20:55

## 2020-08-11 RX ADMIN — TRAZODONE HYDROCHLORIDE 50 MG: 50 TABLET ORAL at 20:55

## 2020-08-11 RX ADMIN — SODIUM CHLORIDE, PRESERVATIVE FREE 10 ML: 5 INJECTION INTRAVENOUS at 09:19

## 2020-08-11 RX ADMIN — DEXTROAMPHETAMINE SACCHARATE, AMPHETAMINE ASPARTATE, DEXTROAMPHETAMINE SULFATE AND AMPHETAMINE SULFATE 20 MG: 2.5; 2.5; 2.5; 2.5 TABLET ORAL at 09:18

## 2020-08-11 ASSESSMENT — PAIN SCALES - GENERAL: PAINLEVEL_OUTOF10: 1

## 2020-08-11 ASSESSMENT — PAIN DESCRIPTION - PROGRESSION
CLINICAL_PROGRESSION: NOT CHANGED

## 2020-08-11 ASSESSMENT — PAIN DESCRIPTION - ONSET: ONSET: ON-GOING

## 2020-08-11 ASSESSMENT — PAIN DESCRIPTION - PAIN TYPE: TYPE: ACUTE PAIN

## 2020-08-11 ASSESSMENT — PAIN DESCRIPTION - FREQUENCY: FREQUENCY: INTERMITTENT

## 2020-08-11 ASSESSMENT — PAIN DESCRIPTION - ORIENTATION: ORIENTATION: LEFT

## 2020-08-11 ASSESSMENT — PAIN DESCRIPTION - LOCATION: LOCATION: ABDOMEN

## 2020-08-11 ASSESSMENT — PAIN DESCRIPTION - DESCRIPTORS: DESCRIPTORS: ACHING;SORE

## 2020-08-11 NOTE — H&P
Hospital Medicine History & Physical      PCP: Penny Ho MD    Date of Admission: 8/10/2020    Date of Service: Pt seen/examined on 8/10/2020 and Admitted to Inpatient with expected LOS greater than two midnights due to medical therapy. Chief Complaint: G-tube complications      History Of Present Illness:      39 y.o. female with PMHx of seizure disorder, hypothyroidism, hyperlipidemia, hydrocephalus, dysphagia and [place] to CVA, feeding by G-tube, anxiety, MARIA TERESA, and ADHD presented to [facility] emergency department at the request of the nursing facility for a chronically leaking G-tube. The patient resides in a skilled nursing facility currently secondary to a reddened G-tube site and receiving antibiotics. Otherwise she resides in a group home. And she chronically pulls at the G-tube sometimes causing it to be come out completely. The patient arrives without a G-tube in place. During the patient's ED course her GI physician Dr. Thierry Lobato was consulted. A discussion took place and the plan is for general surgery to take the patient to surgery tomorrow for a J-tube placement. The ED did speak with Dr. Espinoza Connor regarding this plan. The patient has been kept n.p.o. The patient does not answer questions for me but she does not appear to be in any acute distress.     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 Authorizing Provider   amphetamine-dextroamphetamine (ADDERALL) 20 MG tablet Take 20 mg by mouth every morning. Historical Provider, MD   amphetamine-dextroamphetamine (ADDERALL) 5 MG tablet Take 5 mg by mouth Daily with lunch. Historical Provider, MD   bisacodyl (DULCOLAX) 10 MG suppository Place 10 mg rectally daily as needed for Constipation    Historical Provider, MD   Sodium Phosphates (FLEET) 7-19 GM/118ML Place 1 enema rectally daily as needed    Historical Provider, MD   magnesium hydroxide (MILK OF MAGNESIA) 400 MG/5ML suspension Take 30 mLs by mouth daily as needed for Constipation    Historical Provider, MD   loratadine (CLARITIN) 10 MG tablet Take 10 mg by mouth daily    Historical Provider, MD   traZODone (DESYREL) 50 MG tablet Take 50 mg by mouth nightly    Historical Provider, MD   valproic acid (DEPAKENE) 250 MG/5ML SYRP Take 5 mLs by mouth 3 times daily 10/6/15   Constancia Dancer, MD   levothyroxine (SYNTHROID) 25 MCG tablet Take 1 tablet by mouth daily 10/6/15   Constancia Dancer, MD   menthol-zinc oxide (CALMOSEPTINE) 0.44-20.6 % OINT ointment Apply topically 2 times daily as needed Max 30 ml per day. Patient taking differently: Apply topically 2 times daily Max 30 ml per day. 10/6/15   Constancia Dancer, MD   polyethylene glycol [facility]) powder Take 17 g by mouth daily 7/6/15   Constancia Dancer, MD   nystatin (MYCOSTATIN) 732007 UNIT/GM cream As directed daily  Patient taking differently: As directed BID; G tube site, any areas of redness 11/10/14   Constancia Dancer, MD       Allergies:  Phenytoin sodium extended    Social History:      The patient currently lives skilled nursing facility    TOBACCO:   reports that she has never smoked. She has never used smokeless tobacco.  ETOH:   reports no history of alcohol use.       Family History:      Reviewed in detail positive as follows:    History reviewed. No pertinent family history. REVIEW OF SYSTEMS:   Pertinent positives as noted in the HPI. All other systems reviewed and negative. PHYSICAL EXAM PERFORMED:    BP 96/66   Pulse 88   Temp 98.3 °F (36.8 °C) (Axillary)   Resp 16   Ht 5' 2\" (1.575 m)   Wt 111 lb 1.8 oz (50.4 kg)   SpO2 96%   BMI 20.32 kg/m²     General appearance:  No apparent distress, appears stated age and cooperative. Underweight female  HEENT:  Normal cephalic, atraumatic without obvious deformity. Pupils equal, round, and reactive to light. Extra ocular muscles intact. Conjunctivae/corneas clear. Neck: Supple, with full range of motion. No jugular venous distention. Trachea midline. Respiratory:  Normal respiratory effort. Clear to auscultation, bilaterally without Rales/Wheezes/Rhonchi. Cardiovascular:  Regular rate and rhythm without murmurs, rubs or gallops. Abdomen: Soft, non-tender, non-distended, without rebound or guarding. Normal bowel sounds. Musculoskeletal:  No clubbing, cyanosis or edema bilaterally. Full range of motion without deformity. Skin: Skin color, texture, turgor normal.  No rashes or lesions. Skin surrounding G-tube site is with erythema. There is tenderness with palpation to this area as the patient pushes my hands away. No fluctuance or induration noted. Scant amount of drainage noted. Neurologic:  Neurovascularly intact without any focal sensory/motor deficits. Cranial nerves: II-XII intact, grossly non-focal.  Psychiatric:  Alert, nonverbal, calm and cooperative but does not really follow commands.   Capillary Refill: Brisk,< 3 seconds   Peripheral Pulses: +2 palpable, equal bilaterally       Labs:     Recent Labs     08/10/20  1711 08/11/20  0502   WBC 5.5 4.6   HGB 14.9 14.3   HCT 44.9 43.2    183     Recent Labs     08/10/20  1711      K 5.2*      CO2 23   BUN 13   CREATININE 0.7   CALCIUM 9.1     No results for input(s): AST, ALT, BILIDIR,

## 2020-08-11 NOTE — PROGRESS NOTES
Comprehensive Nutrition Assessment    Type and Reason for Visit:  Positive Nutrition Screen    Nutrition Recommendations/Plan:   Once J tube placed and wanting to start TF, recommend Continuous TF Jevity 1.2 (standard with fiber) formula with goal rate of 55 mL/hr x 24 hrs (rate adjusted for nrsg care, run time ~20 hrs). Flush per MD. Do not check residuals with J-tube. May need SLP eval if expecting to allow oral intakes. Nutrition Assessment:  Pt presented with leaking from open g-tube site. PMH of speech and language deficit from CVA, chronic dysphagia, MARIA TERESA, and paraparesis. Pt with had a G-tube since 1984 following a CVA from [address]. Pt G-tube was removed 7/28 since she had pulled it out and cellulitis started. G-tube was not replaced since pt had not been using it for the past year d/t eating fairly well. Pt was on a pureed, mod thick on 8/1 but per NH, pt on soft and bite sized. Will need SLP to confirm appropriate diet when diet advances. Per NH pt has not been eating well since tube has been out. Surgery planning to place Jtube. Won't place gtube d/t possibility of continuing leaking with Gtube site and not healing appropriately. Pt to go for Jtube placement today. Will provide TF rec's. Malnutrition Assessment:  Malnutrition Status: At risk for malnutrition    Context:  Chronic Illness     Findings of the 6 clinical characteristics of malnutrition:  Energy Intake:  Mild decrease in energy intake (Comment)  Weight Loss:  No significant weight loss     Body Fat Loss:  Unable to assess     Muscle Mass Loss:  Unable to assess    Fluid Accumulation:  No significant fluid accumulation     Strength:  Not Performed    Estimated Daily Nutrient Needs:  Energy (kcal):  7717-5166 kcal (25-30 kcal/kg CBW); Weight Used for Energy Requirements:        Protein (g):   gm (1.2-2 gm/kg CBW);  Weight Used for Protein Requirements:           Fluid (ml/day):  1 mL/kcal; Weight Used for Fluid Requirements: Nutrition Related Findings:  No edema noted; No BM documented      Wounds:  Surgical Wound(open gtube site)       Current Nutrition Therapies:    Diet NPO Effective Now  Current Tube Feeding (TF) Orders: (Recommendations)  · Goal TF & Flush Orders Provides: Once J tube placed, recommend starting Continous TF Jevity 1.2 (standard with fiber) formula with goal rate of 55mL/hr x 24 hrs (rate adjusted to allow time for nrsg care, run time ~20 hrs). Flush per MD. Regimen provides: 110 mL TV, 1320 kcal, 61 gm pro, 888 mL free water. Anthropometric Measures:  · Height: 5' 2\" (157.5 cm)  · Current Body Weight: 111 lb (50.3 kg)   · Admission Body Weight: 111 lb (50.3 kg)    · Usual Body Weight: 110 lb (49.9 kg)     · Ideal Body Weight: 110 lbs; % Ideal Body Weight 100.9 %   · BMI: 20.3  · BMI Categories: Normal Weight (BMI 18.5-24. 9)       Nutrition Diagnosis:   · Inadequate oral intake related to cognitive or neurological impairment, altered GI function, swallowing difficulty as evidenced by poor intake prior to admission, nutrition support - enteral nutrition      Nutrition Interventions:   Food and/or Nutrient Delivery:  Continue NPO, Start Tube Feeding(when appropriate, start diet and TF)  Nutrition Education/Counseling:  No recommendation at this time   Coordination of Nutrition Care:  Continued Inpatient Monitoring    Goals:  tolerate most appropriate form of nutrition per MD       Nutrition Monitoring and Evaluation:   Food/Nutrient Intake Outcomes:  Diet Advancement/Tolerance, Enteral Nutrition Intake/Tolerance  Physical Signs/Symptoms Outcomes:  Biochemical Data, Chewing or Swallowing, Constipation, Diarrhea, GI Status, Nausea or Vomiting, Fluid Status or Edema, Weight, Skin, Nutrition Focused Physical Findings     Discharge Planning:     Too soon to determine     Electronically signed by Sai Pollack RD, LD on 8/11/20 at 10:18 AM EDT    Contact: [phone]

## 2020-08-11 NOTE — CARE COORDINATION
Chart reviewed and patient from [place] [address] [address]. Call to Irene Pereira ([phone]) at [place] [address] [address]. Confirms patient was there skilled. No precert needed for return.  left with Eloisa Ochoa ([phone]) requesting call back to confirm plan to return    Electronically signed by DINO Patricio on 8/11/2020 at 9:38 AM        Spoke with legal guardian Velia Hardeep.  Confirmed plan to return to [place] [address] [address] [facility] at discharge    Electronically signed by DINO Patricio on 8/11/2020 at 4:16 PM

## 2020-08-11 NOTE — DISCHARGE INSTR - COC
Continuity of Care Form    Patient Name: Cuauhtemoc Canseco   :  1975  MRN:  [id number]    Admit date:  8/10/2020  Discharge date:  2020      Code Status Order: Full Code   Advance Directives:   Advance Care Flowsheet Documentation     Date/Time Healthcare Directive Type of Healthcare Directive Copy in [address] Agent's Name Healthcare Agent's Phone Number    08/10/20 2330  No, patient does not have an advance directive for healthcare treatment -- -- -- -- --          Admitting Physician:  Claire Banuelos DO  PCP: Sree Garcia MD    Discharging Nurse: Electronically signed by Tico Jo RN on 2020 at 2:45 PM    [address] Unit/Room#: U0R-3378/8916-18  Discharging Unit Phone Number: [phone]    Emergency Contact:   Extended Emergency Contact Information  Primary Emergency Contact: gianluca avila  [place] Phone: [phone]  Relation: Niece/Nephew    Past Surgical History:  Past Surgical History:   Procedure Laterality Date    CSF SHUNT      GASTROSTOMY TUBE PLACEMENT         Immunization History:   Immunization History   Administered Date(s) Administered    Influenza 10/18/2013       Active Problems:  Patient Active Problem List   Diagnosis Code    Head injury S09.90XA    Paraparesis ([address] G82.20    Speech 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 I69.328    Dysphagia R13.10    Feeding by G-tube ([address] Z93.1    Hypothyroidism E03.9    Hyperlipidemia E78.5    Anxiety F41.9    Seizure disorder ([address] G40.909    Hydrocephalus ([address] G91.9    ADHD (attention deficit hyperactivity disorder) F90.9    Vitamin D deficiency E55.9    Cellulitis, abdominal wall L03.311    Abdominal wall cellulitis L03.311    MARIA TERESA (acute kidney injury) ([address] N17.9    Cellulitis L03.90    Feeding tube dysfunction T85.598A       Isolation/Infection:   Isolation          Droplet Plus        Patient Infection Status     Infection Onset Added Last Indicated Last Indicated By Review Planned Expiration Resolved Resolved By    COVID-19 Rule Out 08/10/20 08/10/20 08/11/20 COVID-19 (Ordered)        Resolved    COVID-19 Rule Out 08/05/20 08/05/20 08/05/20 COVID-19 (Ordered)   08/05/20 Rule-Out Test Resulted          Nurse Assessment:  Last Vital Signs: /78   Pulse 95   Temp 98.8 °F (37.1 °C) (Axillary)   Resp 18   Ht 5' 2\" (1.575 m)   Wt 111 lb 1.8 oz (50.4 kg)   SpO2 96%   BMI 20.32 kg/m²     Last documented pain score (0-10 scale): Pain Level: 1  Last Weight:   Wt Readings from Last 1 Encounters:   08/10/20 111 lb 1.8 oz (50.4 kg)     Mental Status:  oriented and alert    IV Access:  - None    Nursing Mobility/ADLs:  Walking   Dependent  Transfer  Dependent  Bathing  Dependent  Dressing  Dependent  Toileting  Dependent  Feeding  Dependent  Med Admin  Dependent  Med Delivery   crushed and prefers mixed with chocolate pudding    Wound Care Documentation and Therapy:  Wound 08/01/20 Abdomen Left; Lower;Medial (Active)   Number of days: 10        Elimination:  Continence:   · Bowel: No  · Bladder: No  Urinary Catheter: None   Colostomy/Ileostomy/Ileal Conduit: No       Date of Last BM: 08/15/2020    Intake/Output Summary (Last 24 hours) at 8/11/2020 1616  Last data filed at 8/11/2020 0436  Gross per 24 hour   Intake 1000 ml   Output 100 ml   Net 900 ml     I/O last 3 completed shifts: In: 1000 [IV Piggyback:1000]  Out: 100 [Urine:100]    Safety Concerns: At Risk for Falls    Impairments/Disabilities:      Speech and Contractures - generalized    Nutrition Therapy:  Current Nutrition Therapy:   - Oral Diet:  Dysphagia 1 pureed    Routes of Feeding: Oral  Liquids: Honey Thick Liquids  Daily Fluid Restriction: no  Last Modified Barium Swallow with Video (Video Swallowing Test): not done    Treatments at the Time of Hospital Discharge:   Respiratory Treatments: ***  Oxygen Therapy:  is not on home oxygen therapy.   Ventilator:    - No ventilator support    Rehab Therapies: Physical Therapy and Occupational Therapy  Weight Bearing Status/Restrictions: No weight bearing restirctions  Other Medical Equipment (for information only, NOT a DME order):  {EQUIPMENT:[id number]}  Other Treatments: ***    Patient's personal belongings (please select all that are sent with patient):  None    RN SIGNATURE:  Electronically signed by Jazmine Angeles RN on 8/16/20 at 2:47 PM EDT      CASE MANAGEMENT/SOCIAL WORK SECTION    Inpatient Status Date: 8/10/2020    Readmission Risk Assessment Score:  Readmission Risk              Risk of Unplanned Readmission:        12           Discharging to Facility/ Agency   · Name: [facility]  · Address: [address] [place] [address], [address] [facility] [address]  · Phone: [phone]  · Fax: [phone]    / signature: Electronically signed by DINO Chatman on 8/16/20 at 12:18 PM EDT      PHYSICIAN SECTION    Prognosis: Good    Condition at Discharge: Stable    Rehab Potential (if transferring to Rehab): Good    Recommended Labs or Other Treatments After Discharge: please give food and medications when patient is upright, high risk for aspiration PNA, follow up with PCP    Physician Certification: I certify the above information and transfer of Annika Linder  is necessary for the continuing treatment of the diagnosis listed and that she requires [facility] for greater 30 days.      Update Admission H&P: No change in H&P    PHYSICIAN SIGNATURE:  Electronically signed by Hollis Cheney MD on 8/16/20 at 11:36 AM EDT

## 2020-08-11 NOTE — ED NOTES
ED SBAR report provider to [address], [address]. Patient to be transported to Room 4272 via stretcher by ED tech. IV site clean, dry, and intact. MEWS score and pain assessed as 0 and documented. Updated patient on plan of care.        Marjean Lesches, RN  08/10/20 2177

## 2020-08-11 NOTE — PLAN OF CARE
Problem: Skin Integrity:  Goal: Will show no infection signs and symptoms  Description: Will show no infection signs and symptoms  Outcome: Ongoing  Goal: Absence of new skin breakdown  Description: Absence of new skin breakdown  Outcome: Ongoing     Problem: Pain:  Goal: Pain level will decrease  Description: Pain level will decrease  Outcome: Ongoing  Goal: Control of acute pain  Description: Control of acute pain  Outcome: Ongoing  Goal: Control of chronic pain  Description: Control of chronic pain  Outcome: Ongoing     Problem: Confusion - Acute:  Goal: Absence of continued neurological deterioration signs and symptoms  Description: Absence of continued neurological deterioration signs and symptoms  Outcome: Ongoing  Goal: Mental status will be restored to baseline  Description: Mental status will be restored to baseline  Outcome: Ongoing     Problem: Discharge Planning:  Goal: Ability to perform activities of daily living will improve  Description: Ability to perform activities of daily living will improve  Outcome: Ongoing  Goal: Participates in care planning  Description: Participates in care planning  Outcome: Ongoing     Problem: Injury - Risk of, Physical Injury:  Goal: Absence of physical injury  Description: Absence of physical injury  Outcome: Ongoing  Goal: Will remain free from falls  Description: Will remain free from falls  Outcome: Ongoing     Problem: Mood - Altered:  Goal: Mood stable  Description: Mood stable  Outcome: Ongoing  Goal: Absence of abusive behavior  Description: Absence of abusive behavior  Outcome: Ongoing  Goal: Verbalizations of feeling emotionally comfortable while being cared for will increase  Description: Verbalizations of feeling emotionally comfortable while being cared for will increase  Outcome: Ongoing     Problem: Psychomotor Activity - Altered:  Goal: Absence of psychomotor disturbance signs and symptoms  Description: Absence of psychomotor disturbance signs and symptoms  Outcome: Ongoing     Problem: Sensory Perception - Impaired:  Goal: Demonstrations of improved sensory functioning will increase  Description: Demonstrations of improved sensory functioning will increase  Outcome: Ongoing  Goal: Decrease in sensory misperception frequency  Description: Decrease in sensory misperception frequency  Outcome: Ongoing  Goal: Able to refrain from responding to false sensory perceptions  Description: Able to refrain from responding to false sensory perceptions  Outcome: Ongoing  Goal: Demonstrates accurate environmental perceptions  Description: Demonstrates accurate environmental perceptions  Outcome: Ongoing  Goal: Able to distinguish between reality-based and nonreality-based thinking  Description: Able to distinguish between reality-based and nonreality-based thinking  Outcome: Ongoing  Goal: Able to interrupt nonreality-based thinking  Description: Able to interrupt nonreality-based thinking  Outcome: Ongoing     Problem: Sleep Pattern Disturbance:  Goal: Appears well-rested  Description: Appears well-rested  Outcome: Ongoing     Problem: Nutrition  Goal: Optimal nutrition therapy  8/11/2020 1205 by Miguel Carreon RN  Outcome: Ongoing  8/11/2020 1022 by Brandy Kong RD, LD  Outcome: Ongoing     Problem: Falls - Risk of:  Goal: Absence of physical injury  Description: Absence of physical injury  Outcome: Ongoing  Goal: Will remain free from falls  Description: Will remain free from falls  Outcome: Ongoing

## 2020-08-11 NOTE — PROGRESS NOTES
Admitted 39 y.o female to room 4272 from the emergency room. Alert to person, disoriented to place, time and situation, denies pain, yet face grimacing. Oriented to room, call light. Patient unable to use call light to express needs, will continue to monitor. Bed to lowest position with door open and call light in reach, alarm on. All fall precautions implemented at this time. I was unable to finish some of patients admission questions due to unresponsiveness and disorientation. I did as much as possible with knowledge from nursing [place].

## 2020-08-11 NOTE — PROGRESS NOTES
Hospitalist Progress Note      PCP: Álvaro Fraga MD    Date of Admission: 8/10/2020    Chief Complaint: Leak G tube site    Hospital Course: 39 y.o. female with PMHx of seizure disorder, hypothyroidism, hyperlipidemia, hydrocephalus, dysphagia and [place] to CVA, feeding by G-tube, anxiety, MARIA TERESA, and ADHD presented to [facility] emergency department at the request of the nursing facility for a chronically leaking G-tube. The patient resides in a skilled nursing facility currently secondary to a reddened G-tube site and receiving antibiotics. Otherwise she resides in a group home. And she chronically pulls at the G-tube sometimes causing it to be come out completely. The patient arrives without a G-tube in place. During the patient's ED course her GI physician Dr. Isabela Buck was consulted. A discussion took place and the plan is for general surgery to take the patient to surgery tomorrow for a J-tube placement. The ED did speak with Dr. Espinoza Connor regarding this plan. The patient has been kept n.p.o. The patient does not answer questions for me but she does not appear to be in any acute distress. Subjective: Patient seen and examined. Appears at baseline from previous discharge. Redness and erythema at site of previous G tube removal. J tube placement today.        Medications:  Reviewed    Infusion Medications   Scheduled Medications    amphetamine-dextroamphetamine  20 mg Oral QAM    amphetamine-dextroamphetamine  5 mg Oral Lunch    levothyroxine  25 mcg Oral Daily    traZODone  50 mg Oral Nightly    valproic acid  250 mg Oral TID    sodium chloride flush  10 mL Intravenous 2 times per day    enoxaparin  40 mg Subcutaneous Daily     PRN Meds: sodium chloride flush, ondansetron      Intake/Output Summary (Last 24 hours) at 8/11/2020 0874  Last data filed at 8/11/2020 0436  Gross per 24 hour   Intake 1000 ml   Output 100 ml   Net 900 ml       Physical Exam Performed:    BP 96/66   Pulse 88   Temp 98.3 °F (36.8 °C) (Axillary)   Resp 16   Ht 5' 2\" (1.575 m)   Wt 111 lb 1.8 oz (50.4 kg)   SpO2 96%   BMI 20.32 kg/m²     General appearance: No apparent distress  HEENT: Pupils equal, round, and reactive to light. Conjunctivae/corneas clear. Neck: Supple, with full range of motion. No jugular venous distention. Trachea midline. Respiratory:  Normal respiratory effort. Cardiovascular: Regular rate and rhythm with normal S1/S2   Abdomen: Soft, non-tender, G tube site with leakage, redness around site  Musculoskeletal: No clubbing, cyanosis or edema bilaterally. Skin: Skin color, texture, turgor normal.  No rashes or lesions. Neurologic:  Alert, grossly non-focal.  Psychiatric: Alert   Capillary Refill: Brisk,< 3 seconds   Peripheral Pulses: +2 palpable, equal bilaterally       Labs:   Recent Labs     08/10/20  1711 08/11/20  0502   WBC 5.5 4.6   HGB 14.9 14.3   HCT 44.9 43.2    183     Recent Labs     08/10/20  1711 08/11/20  0502    139   K 5.2* 3.8    106   CO2 23 22   BUN 13 8   CREATININE 0.7 0.5*   CALCIUM 9.1 8.6     Recent Labs     08/11/20  0502   AST 16   ALT 25   BILITOT 0.3   ALKPHOS 41     No results for input(s): INR in the last 72 hours. No results for input(s): [facility] in the last 72 hours.     Urinalysis:      Lab Results   Component Value Date    NITRU Negative 08/10/2020    WBCUA 1 07/11/2019    BACTERIA 1+ 07/11/2019    RBCUA 3 07/11/2019    BLOODU Negative 08/10/2020    SPECGRAV 1.019 08/10/2020    GLUCOSEU Negative 08/10/2020       Radiology:  No orders to display           Assessment/Plan:    Chronic G-tube complications and cellulitis  -J-tube placement by general surgery, Dr. Emily Dumont  -Sukhi Munguia test for preop, no actual concern for COVID at this time  -G-tube site with erythema without fluctuance or induration     Hyperlipidemia  -Not taking meds, continue healthy lifestyles     Thyroid disease  -Continue Synthroid     ADHD  -Continue home meds     Seizure disorder  -No medications listed    DVT Prophylaxis: lovenox  Diet: Diet NPO Effective Now  Code Status: Full Code    PT/OT Eval Status: NA    Dispo - Inpt, DC post op if stable    Velia Powell MD

## 2020-08-11 NOTE — PROGRESS NOTES
4 Eyes Skin Assessment     The patient is being assess for  Admission    I agree that 2 RN's have performed a thorough Head to Toe Skin Assessment on the patient. ALL assessment sites listed below have been assessed. Areas assessed by both nurses: [place]   [x]   Head, Face, and Ears   [x]   Shoulders, Back, and Chest  [x]   Arms, Elbows, and Hands   [x]   Coccyx, Sacrum, and IschIum  [x]   Legs, Feet, and Heels        Does the Patient have Skin Breakdown?   Yes LDA WOUND CARE was Initiated documentation include the Jordyn-wound, Wound Assessment, Measurements, Dressing Treatment, Drainage, and Color\",         [place]ah Prevention initiated:  Yes   Wound Care Orders initiated:  Yes      [address]  nurse consulted for Pressure Injury (Stage 3,4, Unstageable, DTI, NWPT, and Complex wounds), New and Established Ostomies:  Yes      Nurse 1 eSignature: Electronically signed by Chelsy Mckenna RN on 8/11/20 at 12:14 AM EDT    **SHARE this note so that the co-signing nurse is able to place an eSignature**    Nurse 2 eSignature: Electronically signed by Caryle Handing, RN on 8/15/20 at 4:39 PM EDT

## 2020-08-11 NOTE — CONSULTS
Surgery Consult Note     Norma Jean PA-C  Pt Name: Elvin Hoff  MRN: [id number]  [place]: 1975  Date of evaluation: 8/11/2020  Primary Care Physician: Penny Ho MD  Chief Complaint: G tube complications  IMPRESSIONS:   1. Leaking out of prior G-tube site.  + firm erythema, no cellulitis noted. 2. Decreased PO intake. (G-tube has been out since 7/28/20, since at that they had not been using the G-tube and her PO intake was adequate  PLANS:   1. NPO  2. Monitor and control any pain  3. G tube placement per GI  4. Treatment consent  SUBJECTIVE:   History of Chief Complaint:    Elvin Hoff is a 39 y.o. female who presents with leaking from prior G-tube site. Patient is difficult to get information from. Most information was obtain from nursing and her chart. She was recently seen at this hospital after pulling her G-tube out at her ECF and developing cellulitis around the insertion site. The patient had her G-tube removed on 7/28/20, since over the past year it had not been used and she was tolerating PO. She was discharged from [facility] on [facility] with antibiotics to her ECF. Yesterday she presented back to the Hospital.  She has not been taking I much PO and the decision to place a J-tube was made. 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 Past Surgical History  Reviewed has a past surgical history that includes csf shunt and Gastrostomy tube placement. Medications  Prior to Admission medications    Medication Sig Start Date End Date Taking?  Authorizing Provider amphetamine-dextroamphetamine (ADDERALL) 20 MG tablet Take 20 mg by mouth every morning. Historical Provider, MD   amphetamine-dextroamphetamine (ADDERALL) 5 MG tablet Take 5 mg by mouth Daily with lunch. Historical Provider, MD   bisacodyl (DULCOLAX) 10 MG suppository Place 10 mg rectally daily as needed for Constipation    Historical Provider, MD   Sodium Phosphates (FLEET) 7-19 GM/118ML Place 1 enema rectally daily as needed    Historical Provider, MD   magnesium hydroxide (MILK OF MAGNESIA) 400 MG/5ML suspension Take 30 mLs by mouth daily as needed for Constipation    Historical Provider, MD   loratadine (CLARITIN) 10 MG tablet Take 10 mg by mouth daily    Historical Provider, MD   traZODone (DESYREL) 50 MG tablet Take 50 mg by mouth nightly    Historical Provider, MD   valproic acid (DEPAKENE) 250 MG/5ML SYRP Take 5 mLs by mouth 3 times daily 10/6/15   Maddison Diaz MD   levothyroxine (SYNTHROID) 25 MCG tablet Take 1 tablet by mouth daily 10/6/15   Maddison Diaz MD   menthol-zinc oxide (CALMOSEPTINE) 0.44-20.6 % OINT ointment Apply topically 2 times daily as needed Max 30 ml per day. Patient taking differently: Apply topically 2 times daily Max 30 ml per day. 10/6/15   Maddison Diaz MD   polyethylene glycol [facility]) powder Take 17 g by mouth daily 7/6/15   Maddison Diaz MD   nystatin (MYCOSTATIN) 932862 UNIT/GM cream As directed daily  Patient taking differently: As directed BID; G tube site, any areas of redness 11/10/14   Maddison Diaz MD    Scheduled Meds:   amphetamine-dextroamphetamine  20 mg Oral QAM    amphetamine-dextroamphetamine  5 mg Oral Lunch    levothyroxine  25 mcg Oral Daily    traZODone  50 mg Oral Nightly    valproic acid  250 mg Oral TID    sodium chloride flush  10 mL Intravenous 2 times per day    enoxaparin  40 mg Subcutaneous Daily     Continuous Infusions:  PRN Meds:.sodium chloride flush, ondansetron  Allergies  is allergic to phenytoin sodium extended.   Family History  Reviewedfamily history is not on file. Social History   reports that she has never smoked. She has never used smokeless tobacco. She reports that she does not drink alcohol or use drugs. EDUCATION  Patient educated about their illness/diagnosis, stated above, and all questions answered. We discussed the importance of nutrition, medications they are taking, and healthy lifestyle. Review of Systems:  Unable to obtain  OBJECTIVE:   VITALS:  height is 5' 2\" (1.575 m) and weight is 111 lb 1.8 oz (50.4 kg). Her axillary temperature is 98.3 °F (36.8 °C). Her blood pressure is 96/66 and her pulse is 88. Her respiration is 16 and oxygen saturation is 96%. CONSTITUTIONAL: Alert and oriented times 3, no acute distress and cooperative to examination with proper mood and affect. SKIN: Skin color, texture, turgor normal. No rashes or lesions. LYMPH: no cervical nodes, no inguinal nodes  HEENT: Head is normocephalic, atraumatic. EOMI, PERRLA. NECK: Supple, symmetrical, trachea midline, no adenopathy, thyroid symmetric, not enlarged and no tenderness, skin normal.  CHEST/LUNGS: chest symmetric with normal A/P diameter, normal respiratory rate and rhythm   CARDIOVASCULAR: Heart sounds are normal.  Regular rate and rhythm   ABDOMEN: Normal shape. Tenderness: around old G-tube site secondary to skin irritation. +hard erythema. No cellulitis noted  RECTAL: deferred, not clinically indicated  NEUROLOGIC: There are no focalizing motor or sensory deficits. CN II-XII are grossly intact. [facility] EXTREMITIES: no cyanosis, no clubbing and no edema.           LABS:     Recent Labs     08/10/20  1711 08/10/20  2045 08/11/20  0502   WBC 5.5  --  4.6   HGB 14.9  --  14.3   HCT 44.9  --  43.2     --  183     --  139   K 5.2*  --  3.8     --  106   CO2 23  --  22   BUN 13  --  8   CREATININE 0.7  --  0.5*   CALCIUM 9.1  --  8.6   AST  --   --  16   ALT  --   --  25   BILITOT  --   --  0.3   NITRU  --  Negative  -- COLORU  --  YELLOW  --      Recent Labs     08/11/20  0502   ALKPHOS 41   ALT 25   AST 16   BILITOT 0.3   LABALBU 3.6     CBC:   Lab Results   Component Value Date    WBC 4.6 08/11/2020    RBC 4.63 08/11/2020    HGB 14.3 08/11/2020    HCT 43.2 08/11/2020    MCV 93.4 08/11/2020    MCH 30.9 08/11/2020    MCHC 33.1 08/11/2020    RDW 13.5 08/11/2020     08/11/2020    MPV 9.5 08/11/2020     CMP:    Lab Results   Component Value Date     08/11/2020    K 3.8 08/11/2020     08/11/2020    CO2 22 08/11/2020    BUN 8 08/11/2020    CREATININE 0.5 08/11/2020    GFRAA >60 08/11/2020    AGRATIO 1.3 08/11/2020    LABGLOM >60 08/11/2020    GLUCOSE 85 08/11/2020    PROT 6.3 08/11/2020    LABALBU 3.6 08/11/2020    CALCIUM 8.6 08/11/2020    BILITOT 0.3 08/11/2020    ALKPHOS 41 08/11/2020    AST 16 08/11/2020    ALT 25 08/11/2020     Urine Culture:  No components found for: CURINE  Blood Culture:  No components found for: CBLOOD, CFUNGUSBL  RADIOLOGY:     CT scan abd/pelvis:  (7/31/20)    No definite acute abnormality identified.         There is a small amount of perisplenic fluid which may relate to the    ventriculoperitoneal shunt (the ventriculoperitoneal shunt terminates in the    left upper abdomen).         No evidence of small bowel obstruction. Thank you for the interesting evaluation. Further recommendations to follow. Cathy Castaneda [place]  General and Vascular Surgery [phone]  Electronically signed by ROSALINO Chawla on 8/11/2020 at 7:45 AM      Surgery Staff  I have examined this patient and read and agree with the note by Maciel Mckinney PA-C from today. Admitted for leakage from prior G-tube site. Removed recently because she was tolerating PO.   Per Dr. Carlos Guillen, cellulitis improved from last admission    Vitals:    08/11/20 0503 08/11/20 0850 08/11/20 0915 08/11/20 0947   BP: 96/66  108/72    Pulse: 88  103    Resp: 16  18    Temp: 98.3 °F (36.8 °C)  98.7 °F (37.1 °C)    TempSrc: Axillary  Axillary    SpO2: 96% 96% 97%    Weight:       Height:    5' 2\" (1.575 m)     NAD, AO x3  Abdomen - with firm indurated skin around gastrocutaneous opening. No maceration  Labs - wnl      A/P Chronic gastrocutaneous fistula. No evidence of cellulitis or macerated skin. Suspect skin changes are chronic. D/W Dr. Tere Hirsch, will plan IR insertion of G-tube through previous tract. If that fails then will need takedown of fistula and J-tube. Continue NPO for now.       Electronically signed by Alex Navarro MD on 8/11/2020 at 12:49 PM

## 2020-08-11 NOTE — PLAN OF CARE
Problem: Nutrition  Goal: Optimal nutrition therapy  Outcome: Ongoing   Nutrition Problem #1: Inadequate oral intake  Intervention: Food and/or Nutrient Delivery: Continue NPO, Start Tube Feeding(when appropriate, start diet and TF)  Nutritional Goals: tolerate most appropriate form of nutrition per MD

## 2020-08-11 NOTE — PROGRESS NOTES
Attempted to get telephone consent for g-tube placement received from lainey Mabry today. Lainey asking to speak to MD performing surgery to discuss alternative options.

## 2020-08-12 PROCEDURE — 94760 N-INVAS EAR/PLS OXIMETRY 1: CPT

## 2020-08-12 PROCEDURE — 1200000000 HC SEMI PRIVATE

## 2020-08-12 PROCEDURE — 2580000003 HC RX 258: Performed by: STUDENT IN AN ORGANIZED HEALTH CARE EDUCATION/TRAINING PROGRAM

## 2020-08-12 PROCEDURE — 6360000002 HC RX W HCPCS: Performed by: STUDENT IN AN ORGANIZED HEALTH CARE EDUCATION/TRAINING PROGRAM

## 2020-08-12 PROCEDURE — 6360000002 HC RX W HCPCS: Performed by: SURGERY

## 2020-08-12 PROCEDURE — C9113 INJ PANTOPRAZOLE SODIUM, VIA: HCPCS | Performed by: SURGERY

## 2020-08-12 PROCEDURE — 6370000000 HC RX 637 (ALT 250 FOR IP): Performed by: NURSE PRACTITIONER

## 2020-08-12 RX ORDER — PANTOPRAZOLE SODIUM 40 MG/10ML
40 INJECTION, POWDER, LYOPHILIZED, FOR SOLUTION INTRAVENOUS DAILY
Status: DISCONTINUED | OUTPATIENT
Start: 2020-08-12 | End: 2020-08-16 | Stop reason: HOSPADM

## 2020-08-12 RX ADMIN — VALPROIC ACID 250 MG: 250 SOLUTION ORAL at 08:28

## 2020-08-12 RX ADMIN — LEVOTHYROXINE SODIUM 25 MCG: 0.03 TABLET ORAL at 05:23

## 2020-08-12 RX ADMIN — ONDANSETRON 4 MG: 2 INJECTION INTRAMUSCULAR; INTRAVENOUS at 23:32

## 2020-08-12 RX ADMIN — VALPROIC ACID 250 MG: 250 SOLUTION ORAL at 20:58

## 2020-08-12 RX ADMIN — TRAZODONE HYDROCHLORIDE 50 MG: 50 TABLET ORAL at 20:58

## 2020-08-12 RX ADMIN — PANTOPRAZOLE SODIUM 40 MG: 40 INJECTION, POWDER, FOR SOLUTION INTRAVENOUS at 14:36

## 2020-08-12 RX ADMIN — SODIUM CHLORIDE, PRESERVATIVE FREE 10 ML: 5 INJECTION INTRAVENOUS at 21:02

## 2020-08-12 RX ADMIN — SODIUM CHLORIDE, PRESERVATIVE FREE 10 ML: 5 INJECTION INTRAVENOUS at 08:30

## 2020-08-12 RX ADMIN — DEXTROAMPHETAMINE SACCHARATE, AMPHETAMINE ASPARTATE, DEXTROAMPHETAMINE SULFATE AND AMPHETAMINE SULFATE 5 MG: 2.5; 2.5; 2.5; 2.5 TABLET ORAL at 11:42

## 2020-08-12 RX ADMIN — Medication 10 ML: at 14:36

## 2020-08-12 RX ADMIN — DEXTROAMPHETAMINE SACCHARATE, AMPHETAMINE ASPARTATE, DEXTROAMPHETAMINE SULFATE AND AMPHETAMINE SULFATE 20 MG: 2.5; 2.5; 2.5; 2.5 TABLET ORAL at 08:28

## 2020-08-12 RX ADMIN — VALPROIC ACID 250 MG: 250 SOLUTION ORAL at 14:37

## 2020-08-12 NOTE — PROGRESS NOTES
Speech Language Pathology    Orders noted for Clinical Swallowing Evaluation. Pt currently out of room for procedure. Will reattempt as schedule allows.     Yennifer Mcclure MS, CCC-SLP #[id number]  Speech Language Pathologist

## 2020-08-12 NOTE — PROGRESS NOTES
No significant change  Multiple unsuccessful attempts made to contact lainey Jackson to discuss percutaneous replacement of G-tube with IR. Continue local wound care for now    Electronically signed by Emma Vera MD on 8/12/2020 at 12:33 PM      Spoke to lainey Jackson. He states that Sangita Archuleta has been eating well up until recently. Had not used G tube for over a year. Will trial back on diet per speech recs. If she fails to take in adequate calories then will plan perc G-tube. Local wound care to site.   Can pouch fistula if outputs increase    Electronically signed by Emma Vera MD on 8/12/2020 at 2:05 PM

## 2020-08-12 NOTE — PLAN OF CARE
Problem: Skin Integrity:  Goal: Will show no infection signs and symptoms  Description: Will show no infection signs and symptoms  8/11/2020 2349 by Yaneth Crawley RN  Outcome: Ongoing  8/11/2020 1205 by Skip Yousif RN  Outcome: Ongoing  Goal: Absence of new skin breakdown  Description: Absence of new skin breakdown  8/11/2020 2349 by Yaneth Crawley RN  Outcome: Ongoing  8/11/2020 1205 by Skip Yousif RN  Outcome: Ongoing     Problem: Pain:  Goal: Pain level will decrease  Description: Pain level will decrease  8/11/2020 2349 by Yaneth Crawley RN  Outcome: Ongoing  8/11/2020 1205 by Skip Yousif RN  Outcome: Ongoing  Goal: Control of acute pain  Description: Control of acute pain  8/11/2020 2349 by Yaneth Crawley RN  Outcome: Ongoing  8/11/2020 1205 by Skip Yousif RN  Outcome: Ongoing  Goal: Control of chronic pain  Description: Control of chronic pain  8/11/2020 2349 by Yaneth Crawley RN  Outcome: Ongoing  8/11/2020 1205 by Skip Yousif RN  Outcome: Ongoing     Problem: Confusion - Acute:  Goal: Absence of continued neurological deterioration signs and symptoms  Description: Absence of continued neurological deterioration signs and symptoms  8/11/2020 2349 by Yaneth Crawley RN  Outcome: Ongoing  8/11/2020 1205 by Skip Yousif RN  Outcome: Ongoing  Goal: Mental status will be restored to baseline  Description: Mental status will be restored to baseline  8/11/2020 2349 by Yaneth Crawley RN  Outcome: Ongoing  8/11/2020 1205 by Skip Yousif RN  Outcome: Ongoing     Problem: Discharge Planning:  Goal: Ability to perform activities of daily living will improve  Description: Ability to perform activities of daily living will improve  8/11/2020 2349 by Yaneth Crawley RN  Outcome: Ongoing  8/11/2020 1205 by Skip Yousif RN  Outcome: Ongoing  Goal: Participates in care planning  Description: Participates in care planning  8/11/2020 2349 by Yaneth Crawley RN  Outcome: Ongoing  8/11/2020 1205 by Skip Yousif RN  Outcome: Ongoing Problem: Injury - Risk of, Physical Injury:  Goal: Absence of physical injury  Description: Absence of physical injury  8/11/2020 2349 by Peng Velásquez RN  Outcome: Ongoing  8/11/2020 1205 by Aubree Mckenna RN  Outcome: Ongoing  Goal: Will remain free from falls  Description: Will remain free from falls  8/11/2020 2349 by Peng Velásquez RN  Outcome: Ongoing  8/11/2020 1205 by Aubree Mckenna RN  Outcome: Ongoing     Problem: Mood - Altered:  Goal: Mood stable  Description: Mood stable  8/11/2020 2349 by Peng Velásquez RN  Outcome: Ongoing  8/11/2020 1205 by Aubree Mckenna RN  Outcome: Ongoing  Goal: Absence of abusive behavior  Description: Absence of abusive behavior  8/11/2020 2349 by Peng Velásquez RN  Outcome: Ongoing  8/11/2020 1205 by Aubree Mckenna RN  Outcome: Ongoing  Goal: Verbalizations of feeling emotionally comfortable while being cared for will increase  Description: Verbalizations of feeling emotionally comfortable while being cared for will increase  8/11/2020 2349 by Peng Velásquez RN  Outcome: Ongoing  8/11/2020 1205 by Aubree Mckenna RN  Outcome: Ongoing     Problem: Psychomotor Activity - Altered:  Goal: Absence of psychomotor disturbance signs and symptoms  Description: Absence of psychomotor disturbance signs and symptoms  8/11/2020 2349 by Peng Velásquez RN  Outcome: Ongoing  8/11/2020 1205 by Aubree Mckenna RN  Outcome: Ongoing     Problem: Sensory Perception - Impaired:  Goal: Demonstrations of improved sensory functioning will increase  Description: Demonstrations of improved sensory functioning will increase  8/11/2020 2349 by Peng Velásquez RN  Outcome: Ongoing  8/11/2020 1205 by Aubree Mckenna RN  Outcome: Ongoing  Goal: Decrease in sensory misperception frequency  Description: Decrease in sensory misperception frequency  8/11/2020 2349 by Peng Velásquez RN  Outcome: Ongoing  8/11/2020 1205 by Aubree Mckenna RN  Outcome: Ongoing  Goal: Able to refrain from responding to false sensory perceptions  Description: Able to refrain from responding to false sensory perceptions  8/11/2020 2349 by Tara Garner RN  Outcome: Ongoing  8/11/2020 1205 by Dariel Sanchez RN  Outcome: Ongoing  Goal: Demonstrates accurate environmental perceptions  Description: Demonstrates accurate environmental perceptions  8/11/2020 2349 by Tara Garner RN  Outcome: Ongoing  8/11/2020 1205 by Dariel Sanchez RN  Outcome: Ongoing  Goal: Able to distinguish between reality-based and nonreality-based thinking  Description: Able to distinguish between reality-based and nonreality-based thinking  8/11/2020 2349 by Tara Garner RN  Outcome: Ongoing  8/11/2020 1205 by Dariel Sanchez RN  Outcome: Ongoing  Goal: Able to interrupt nonreality-based thinking  Description: Able to interrupt nonreality-based thinking  8/11/2020 2349 by Tara Garner RN  Outcome: Ongoing  8/11/2020 1205 by Dariel Sanchez RN  Outcome: Ongoing     Problem: Sleep Pattern Disturbance:  Goal: Appears well-rested  Description: Appears well-rested  8/11/2020 2349 by Tara Garner RN  Outcome: Ongoing  8/11/2020 1205 by Dariel Sanchez RN  Outcome: Ongoing     Problem: Nutrition  Goal: Optimal nutrition therapy  8/11/2020 2349 by Tara Garner RN  Outcome: Ongoing  8/11/2020 1205 by Dariel Sanchez RN  Outcome: Ongoing  8/11/2020 1022 by Sai Pollack RD, LD  Outcome: Ongoing     Problem: Falls - Risk of:  Goal: Absence of physical injury  Description: Absence of physical injury  8/11/2020 2349 by Tara Garner RN  Outcome: Ongoing  8/11/2020 1205 by Dariel Sanchez RN  Outcome: Ongoing  Goal: Will remain free from falls  Description: Will remain free from falls  8/11/2020 2349 by Tara Garner RN  Outcome: Ongoing  8/11/2020 1205 by Dariel Sanchez RN  Outcome: Ongoing     Problem: Restraint Use - Nonviolent/Non-Self-Destructive Behavior:  Goal: Absence of restraint indications  Description: Absence of restraint indications  Outcome: Ongoing  Goal: Absence of restraint-related injury  Description: Absence of

## 2020-08-12 NOTE — PROGRESS NOTES
(1.575 m)   Wt 110 lb 0.9 oz (49.9 kg)   SpO2 96%   BMI 20.13 kg/m²     General appearance: No apparent distress  HEENT: Pupils equal, round, and reactive to light. Conjunctivae/corneas clear. Neck: Supple, with full range of motion. No jugular venous distention. Trachea midline. Respiratory:  Normal respiratory effort. Cardiovascular: Regular rate and rhythm with normal S1/S2   Abdomen: Soft, non-tender, G tube site with leakage, redness around site  Musculoskeletal: No clubbing, cyanosis or edema bilaterally. Skin: Skin color, texture, turgor normal.  No rashes or lesions. Neurologic:  Alert, grossly non-focal.  Psychiatric: Alert   Capillary Refill: Brisk,< 3 seconds   Peripheral Pulses: +2 palpable, equal bilaterally       Labs:   Recent Labs     08/10/20  1711 08/11/20  0502   WBC 5.5 4.6   HGB 14.9 14.3   HCT 44.9 43.2    183     Recent Labs     08/10/20  1711 08/11/20  0502    139   K 5.2* 3.8    106   CO2 23 22   BUN 13 8   CREATININE 0.7 0.5*   CALCIUM 9.1 8.6     Recent Labs     08/11/20  0502   AST 16   ALT 25   BILITOT 0.3   ALKPHOS 41     No results for input(s): INR in the last 72 hours. No results for input(s): Ilan Keel in the last 72 hours. Urinalysis:      Lab Results   Component Value Date    NITRU Negative 08/10/2020    WBCUA 1 07/11/2019    BACTERIA 1+ 07/11/2019    RBCUA 3 07/11/2019    BLOODU Negative 08/10/2020    SPECGRAV 1.019 08/10/2020    GLUCOSEU Negative 08/10/2020       Radiology:  IR FLUORO GUIDED GASTROSTOMY TUBE INSERTION PERC W CONTRAST    (Results Pending)     Assessment/Plan:    Patient is a 35-year-old female with past medical history of dysphagia secondary to CVA, PEG tube who presented to hospital for chronic leaking G-tube.     Assessment  Chronic G-tube complications, cellulitis  Hyperlipidemia  Hypothyroidism  ADHD  Seizure disorder    Plan  General surgery has been following, plan for possible G-tube placement per surgery  Continue Synthyroid  Continue home aderal, valproic acid  DVT Prophylaxis: lovenox  Diet: Diet NPO, After Midnight  Code Status: Full Code    PT/OT Eval Status: NA    Dispo - Inpt, DC post op if stable    Ebony Ch MD

## 2020-08-12 NOTE — PROGRESS NOTES
Spoke to patient's floor nurse about the G-tube replacement. Patient is refusing this procedure at this time. Please feel free to call Radiology & speak to one of the Radiology nurses if anything changes.     Radiology #[id number]    Thank you,    ANDRES NayakN, RN

## 2020-08-12 NOTE — PLAN OF CARE
Problem: Skin Integrity:  Goal: Will show no infection signs and symptoms  Description: Will show no infection signs and symptoms  8/12/2020 0947 by Sheree Arce RN  Outcome: Ongoing     Problem: Confusion - Acute:  Goal: Absence of continued neurological deterioration signs and symptoms  Description: Absence of continued neurological deterioration signs and symptoms  8/12/2020 0947 by Sheree Arce RN  Outcome: Ongoing     Problem: Discharge Planning:  Goal: Ability to perform activities of daily living will improve  Description: Ability to perform activities of daily living will improve  8/12/2020 0947 by Sheree Arce RN  Outcome: Ongoing     Problem: Injury - Risk of, Physical Injury:  Goal: Absence of physical injury  Description: Absence of physical injury  8/12/2020 0947 by Sheree Arce RN  Outcome: Ongoing     Problem: Mood - Altered:  Goal: Mood stable  Description: Mood stable  8/12/2020 0947 by Sheree Arce RN  Outcome: Ongoing     Problem: Sensory Perception - Impaired:  Goal: Demonstrations of improved sensory functioning will increase  Description: Demonstrations of improved sensory functioning will increase  8/12/2020 0947 by Sheree Arce RN  Outcome: Ongoing     Problem: Psychomotor Activity - Altered:  Goal: Absence of psychomotor disturbance signs and symptoms  Description: Absence of psychomotor disturbance signs and symptoms  8/12/2020 0947 by Sheree Arce RN  Outcome: Ongoing     Problem: Sleep Pattern Disturbance:  Goal: Appears well-rested  Description: Appears well-rested  8/12/2020 0947 by Sheree Arce RN  Outcome: Ongoing     Problem: Nutrition  Goal: Optimal nutrition therapy  8/12/2020 0947 by Sheree Arce RN  Outcome: Ongoing     Problem: Falls - Risk of:  Goal: Absence of physical injury  Description: Absence of physical injury  8/12/2020 0947 by Sheree Arce RN  Outcome: Ongoing     Problem: Restraint Use - Nonviolent/Non-Self-Destructive Behavior:  Goal: Absence of restraint indications  Description: Absence of restraint indications  8/12/2020 0947 by Edwin Pacheco RN  Outcome: Ongoing

## 2020-08-12 NOTE — PROGRESS NOTES
This writer spoke with lainey Edward about patient procedure. He stated that he works night shift and just woke up, he attempted to get ahold of MD and was unsuccessful. This writer call general surgery and left message for lainey Edward to be contacted. Electronically signed by Alma Delia Dorantes RN on 8/12/2020 at 1:31 PM

## 2020-08-13 PROCEDURE — APPSS15 APP SPLIT SHARED TIME 0-15 MINUTES: Performed by: NURSE PRACTITIONER

## 2020-08-13 PROCEDURE — 92610 EVALUATE SWALLOWING FUNCTION: CPT

## 2020-08-13 PROCEDURE — APPNB15 APP NON BILLABLE TIME 0-15 MINS: Performed by: NURSE PRACTITIONER

## 2020-08-13 PROCEDURE — 1200000000 HC SEMI PRIVATE

## 2020-08-13 PROCEDURE — 6370000000 HC RX 637 (ALT 250 FOR IP): Performed by: NURSE PRACTITIONER

## 2020-08-13 PROCEDURE — C9113 INJ PANTOPRAZOLE SODIUM, VIA: HCPCS | Performed by: SURGERY

## 2020-08-13 PROCEDURE — 6360000002 HC RX W HCPCS: Performed by: STUDENT IN AN ORGANIZED HEALTH CARE EDUCATION/TRAINING PROGRAM

## 2020-08-13 PROCEDURE — 6360000002 HC RX W HCPCS: Performed by: SURGERY

## 2020-08-13 PROCEDURE — 2580000003 HC RX 258: Performed by: STUDENT IN AN ORGANIZED HEALTH CARE EDUCATION/TRAINING PROGRAM

## 2020-08-13 RX ORDER — CLOTRIMAZOLE AND BETAMETHASONE DIPROPIONATE 10; .64 MG/G; MG/G
CREAM TOPICAL 2 TIMES DAILY
Status: DISCONTINUED | OUTPATIENT
Start: 2020-08-13 | End: 2020-08-16 | Stop reason: HOSPADM

## 2020-08-13 RX ADMIN — CLOTRIMAZOLE AND BETAMETHASONE DIPROPIONATE: 10; .5 CREAM TOPICAL at 21:38

## 2020-08-13 RX ADMIN — CLOTRIMAZOLE AND BETAMETHASONE DIPROPIONATE: 10; .5 CREAM TOPICAL at 15:57

## 2020-08-13 RX ADMIN — DEXTROAMPHETAMINE SACCHARATE, AMPHETAMINE ASPARTATE, DEXTROAMPHETAMINE SULFATE AND AMPHETAMINE SULFATE 20 MG: 2.5; 2.5; 2.5; 2.5 TABLET ORAL at 10:17

## 2020-08-13 RX ADMIN — SODIUM CHLORIDE, PRESERVATIVE FREE 10 ML: 5 INJECTION INTRAVENOUS at 10:18

## 2020-08-13 RX ADMIN — ENOXAPARIN SODIUM 40 MG: 40 INJECTION SUBCUTANEOUS at 10:18

## 2020-08-13 RX ADMIN — SODIUM CHLORIDE, PRESERVATIVE FREE 10 ML: 5 INJECTION INTRAVENOUS at 21:44

## 2020-08-13 RX ADMIN — LEVOTHYROXINE SODIUM 25 MCG: 0.03 TABLET ORAL at 10:19

## 2020-08-13 RX ADMIN — VALPROIC ACID 250 MG: 250 SOLUTION ORAL at 10:17

## 2020-08-13 RX ADMIN — DEXTROAMPHETAMINE SACCHARATE, AMPHETAMINE ASPARTATE, DEXTROAMPHETAMINE SULFATE AND AMPHETAMINE SULFATE 5 MG: 2.5; 2.5; 2.5; 2.5 TABLET ORAL at 12:39

## 2020-08-13 RX ADMIN — VALPROIC ACID 250 MG: 250 SOLUTION ORAL at 15:57

## 2020-08-13 RX ADMIN — TRAZODONE HYDROCHLORIDE 50 MG: 50 TABLET ORAL at 21:40

## 2020-08-13 RX ADMIN — PANTOPRAZOLE SODIUM 40 MG: 40 INJECTION, POWDER, FOR SOLUTION INTRAVENOUS at 10:18

## 2020-08-13 RX ADMIN — VALPROIC ACID 250 MG: 250 SOLUTION ORAL at 21:39

## 2020-08-13 ASSESSMENT — PAIN SCALES - WONG BAKER
WONGBAKER_NUMERICALRESPONSE: 0
WONGBAKER_NUMERICALRESPONSE: 0

## 2020-08-13 ASSESSMENT — PAIN SCALES - GENERAL: PAINLEVEL_OUTOF10: 0

## 2020-08-13 NOTE — PROGRESS NOTES
Comprehensive Nutrition Assessment    Type and Reason for Visit:  Reassess    Nutrition Recommendations/Plan:   Continue Dysphagia Pureed with honey thick liquids. Start Ensure pudding TID and Magic Cup BID. Nutrition Assessment:  Pt did not have Jtube placed per famiy wishes. MD willing to trial PO intake but if unable to consume adequate intakes, will replace tube. Diet advanced yesterday to Dysphagia Pureed with honey thick liquids. Only 1 meal noted, 1-25%. Will trial supplement. Malnutrition Assessment:  Malnutrition Status: At risk for malnutrition  Context:  Chronic Illness     Findings of the 6 clinical characteristics of malnutrition:  Energy Intake:  Mild decrease in energy intake  Weight Loss:  No significant weight loss     Body Fat Loss:  Unable to assess     Muscle Mass Loss:  Unable to assess    Fluid Accumulation:  No significant fluid accumulation     Strength:  Not Performed    Estimated Daily Nutrient Needs:  Energy (kcal):  9313-3712 kcal (25-30 kcal/kg CBW)  Protein (g):   gm (1.2-2 gm/kg CBW)     Fluid (ml/day):  1 mL/kcal    Nutrition Related Findings:   8/12      Wounds:  Surgical Wound(open gtube site)       Current Nutrition Therapies:    DIET DYSPHAGIA PUREED; Moderately Thick (Honey)    Anthropometric Measures:  · Height: 5' 2\" (157.5 cm)  · Current Body Weight: 113 lb (51.3 kg)   · Admission Body Weight: 111 lb (50.3 kg)    · Usual Body Weight: 110 lb (49.9 kg)     · Ideal Body Weight: 110 lbs; % Ideal Body Weight 102.7 %   · BMI: 20.7  · BMI Categories: Normal Weight (BMI 18.5-24. 9)       Nutrition Diagnosis:   · Inadequate oral intake related to cognitive or neurological impairment, altered GI function, swallowing difficulty as evidenced by poor intake prior to admission, nutrition support - enteral nutrition      Nutrition Interventions:   Food and/or Nutrient Delivery:  Continue Current Diet, Start Oral Nutrition Supplement  Nutrition Education/Counseling:  No

## 2020-08-13 NOTE — PROGRESS NOTES
[facility] [phone]                                     Daily Progress Note                                                         Pt Name: Clarice Jacobson  Medical Record Number: [id number]  Date of Birth 1975   Today's Date: 8/13/2020  Chief Complaint   Patient presents with    G Tube Complications     was removed due to cellulitis per NH and left out because pt was taken adequate po food/liquid. GT site is draining stomach acid/fluid        ASSESSMENT/PLAN  Gastrocutaneous fistula  -Surgeon spoke with Charli Sanchez yesterday who reports she has been eating well up until recently and G tube had not been used in over a year. -If she fails to take in adequate PO, then will plan perc G tube. -Doesn't seem to have much output from GC fistula. Consider placing ostomy appliance on it if it becomes an issue.   -Improved redness. Will add lotrisone cream BID. -Surgery to sign off. Please call with questions. Discharge Planning: per primary team      SUBJECTIVE  Danette is in four point restraints when seen. Answers basic questions appropriately. OBJECTIVE  VITALS:  height is 5' 2\" (1.575 m) and weight is 113 lb 5.1 oz (51.4 kg). Her axillary temperature is 98.8 °F (37.1 °C). Her blood pressure is 117/76 and her pulse is 121. Her respiration is 22 and oxygen saturation is 92%. INTAKE/OUTPUT:    Intake/Output Summary (Last 24 hours) at 8/13/2020 1254  Last data filed at 8/13/2020 1155  Gross per 24 hour   Intake 60 ml   Output --   Net 60 ml     GENERAL: alert, mild distress, in four point restraints  ABDOMEN:Soft, nontender. Mild tenderness GC fistula site. Improving redness. EXTREMITY: no cyanosis, clubbing or edema    I/O last 3 completed shifts:   In: 30 [P.O.:30]  Out: -       LABS  Recent Labs     08/10/20  2045 08/11/20  0502   WBC  --  4.6   HGB  --  14.3   HCT  --  43.2   PLT  --  183   NA  --  139   K  --  3.8

## 2020-08-13 NOTE — PROGRESS NOTES
Hospitalist Progress Note      PCP: Moustapha Bland MD    Date of Admission: 8/10/2020    Chief Complaint: Leak G tube site    Hospital Course: 39 y.o. female with PMHx of seizure disorder, hypothyroidism, hyperlipidemia, hydrocephalus, dysphagia and Brice to CVA, feeding by G-tube, anxiety, MARIA TERESA, and ADHD presented to [facility] emergency department at the request of the nursing facility for a chronically leaking G-tube. The patient resides in a skilled nursing facility currently secondary to a reddened G-tube site and receiving antibiotics. Otherwise she resides in a group home. And she chronically pulls at the G-tube sometimes causing it to be come out completely. The patient arrives without a G-tube in place. During the patient's ED course her GI physician Dr. Celestino Mistry was consulted. A discussion took place and the plan is for general surgery to take the patient to surgery tomorrow for a J-tube placement. The ED did speak with Dr. Espinoza Connor regarding this plan. The patient has been kept n.p.o. The patient does not answer questions for me but she does not appear to be in any acute distress. Subjective: Patient seen and examined. Patient in bilateral wrist restraints, patient is confused alert oriented x0.   Patient is verbal but not following commands, alert oriented x0     Medications:  Reviewed    Infusion Medications   Scheduled Medications    clotrimazole-betamethasone   Topical BID    pantoprazole  40 mg Intravenous Daily    amphetamine-dextroamphetamine  20 mg Oral QAM    amphetamine-dextroamphetamine  5 mg Oral Lunch    levothyroxine  25 mcg Oral Daily    traZODone  50 mg Oral Nightly    valproic acid  250 mg Oral TID    sodium chloride flush  10 mL Intravenous 2 times per day    enoxaparin  40 mg Subcutaneous Daily     PRN Meds: sodium chloride flush, ondansetron      Intake/Output Summary (Last 24 hours) at 8/13/2020 1346  Last data filed at 8/13/2020 1155  Gross per 24 hour   Intake 60 ml   Output --   Net 60 ml       Physical Exam Performed:    /76   Pulse 121   Temp 98.8 °F (37.1 °C) (Axillary)   Resp 22   Ht 5' 2\" (1.575 m)   Wt 113 lb 5.1 oz (51.4 kg)   SpO2 92%   BMI 20.73 kg/m²     General appearance: No apparent distress  HEENT: Pupils equal, round, and reactive to light. Conjunctivae/corneas clear. Neck: Supple, with full range of motion. No jugular venous distention. Trachea midline. Respiratory:  Normal respiratory effort. Cardiovascular: Regular rate and rhythm with normal S1/S2   Abdomen: Soft, non-tender, G tube site with leakage, redness around site  Musculoskeletal: No clubbing, cyanosis or edema bilaterally. Skin: Skin color, texture, turgor normal.  No rashes or lesions. Neurologic:  Alert, grossly non-focal.  Psychiatric: Alert   Capillary Refill: Brisk,< 3 seconds   Peripheral Pulses: +2 palpable, equal bilaterally       Labs:   Recent Labs     08/10/20  1711 08/11/20  0502   WBC 5.5 4.6   HGB 14.9 14.3   HCT 44.9 43.2    183     Recent Labs     08/10/20  1711 08/11/20  0502    139   K 5.2* 3.8    106   CO2 23 22   BUN 13 8   CREATININE 0.7 0.5*   CALCIUM 9.1 8.6     Recent Labs     08/11/20  0502   AST 16   ALT 25   BILITOT 0.3   ALKPHOS 41     No results for input(s): INR in the last 72 hours. No results for input(s): Serene Jerome in the last 72 hours. Urinalysis:      Lab Results   Component Value Date    NITRU Negative 08/10/2020    WBCUA 1 07/11/2019    BACTERIA 1+ 07/11/2019    RBCUA 3 07/11/2019    BLOODU Negative 08/10/2020    SPECGRAV 1.019 08/10/2020    GLUCOSEU Negative 08/10/2020       Radiology:  IR FLUORO GUIDED GASTROSTOMY TUBE INSERTION PERC W CONTRAST    (Results Pending)     Assessment/Plan:    Patient is a 51-year-old female with past medical history of dysphagia secondary to CVA, PEG tube who presented to hospital for chronic leaking G-tube.     Assessment  Chronic G-tube complications,

## 2020-08-13 NOTE — PROGRESS NOTES
Pt awake and alert to self. Pt has expressive aphagia but is able to be understood. Pt requesting diet coke or diet pepsi. Pt able to drink honey thick liquids from a straw without coughing, encouraged pt to slow down and clear her throat with each sip. G-tube site remains red, open to air. Pt is anxious at times but calms down if someone is there to talk to her. Bilateral wrists restraints in place. Side rails padded. Call light in reach. Will monitor.  Electronically signed by Artemio Garcia RN on 8/13/2020 at 12:02 AM

## 2020-08-13 NOTE — PROGRESS NOTES
Pt got out of wrists restraints and attempted to get out of bed. When staff put pt back in restraints, pt became extremely agitated and began throwing her legs over her head and over the side rail attempting to get out of bed. Secure message sent to Dr. Oscar Banuelos and received order for bilateral ankle restraints. Restraints placed. Pt remains agitated and confused. Bed alarm active.

## 2020-08-13 NOTE — PROGRESS NOTES
Pt awake and requesting to eat. Offered pudding and pt wants chocolate but none available so pt eating vanilla pudding. Pt is a complete feed and will eat things that she likes.

## 2020-08-13 NOTE — PROGRESS NOTES
Physician Progress Note      Amber Roberts  CSN #:                  [id number]  :                       1975  ADMIT DATE:       8/10/2020 4:13 PM  [address] [place] DATE:  RESPONDING  PROVIDER #:        Sanam Samayoa MD          QUERY TEXT:    Pt admitted with Malfunctioning G tube with cellulitis. Pt noted to have   confusion/agitation If possible, please document in the progress notes and   discharge summary if you are evaluating and / or treating any of the   following: The medical record reflects the following:  Risk Factors: TBI CVA seizure disorder  Clinical Indicators: per Nurses Notes  NN  6642 restraints in place and   hourly checks completed, call light within reach, bed/chair alarm on. AM meds   complete, patient tolerated well. VSS and WDL. Patient seems to be in   distress, grimacing, kicking legs, clinching jaw this is patient baseline,   NN 0757 pt became extremely agitated and began throwing her legs over her   head and over the side rail attempting to get out of bed. Secure message sent   to Dr. Marva Bray and received order for bilateral ankle restraints. Restraints   placed.  Pt remains agitated and confused  Treatment: Restraints, and  bed alarm  Options provided:  -- Metabolic encephalopathy  -- Delirium, Please specify cause  -- Other - I will add my own diagnosis  -- Disagree - Not applicable / Not valid  -- Disagree - Clinically unable to determine / Unknown  -- Refer to Clinical Documentation Reviewer    PROVIDER RESPONSE TEXT:    Patient is at baseline, chronic encephalopathy from previous CVA    Query created by: Lashonda Kwok on 2020 10:52 AM      Electronically signed by:  Sanam Samayoa MD 2020 10:59 AM

## 2020-08-13 NOTE — PLAN OF CARE
Problem: Skin Integrity:  Goal: Will show no infection signs and symptoms  Description: Will show no infection signs and symptoms  Outcome: Ongoing  Goal: Absence of new skin breakdown  Description: Absence of new skin breakdown  Outcome: Ongoing     Problem: Pain:  Goal: Pain level will decrease  Description: Pain level will decrease  Outcome: Ongoing  Goal: Control of acute pain  Description: Control of acute pain  Outcome: Ongoing  Goal: Control of chronic pain  Description: Control of chronic pain  Outcome: Ongoing     Problem: Confusion - Acute:  Goal: Absence of continued neurological deterioration signs and symptoms  Description: Absence of continued neurological deterioration signs and symptoms  Outcome: Ongoing  Goal: Mental status will be restored to baseline  Description: Mental status will be restored to baseline  Outcome: Ongoing     Problem: Discharge Planning:  Goal: Ability to perform activities of daily living will improve  Description: Ability to perform activities of daily living will improve  Outcome: Ongoing  Goal: Participates in care planning  Description: Participates in care planning  Outcome: Ongoing     Problem: Injury - Risk of, Physical Injury:  Goal: Absence of physical injury  Description: Absence of physical injury  Outcome: Ongoing  Goal: Will remain free from falls  Description: Will remain free from falls  Outcome: Ongoing     Problem: Mood - Altered:  Goal: Mood stable  Description: Mood stable  Outcome: Ongoing  Goal: Absence of abusive behavior  Description: Absence of abusive behavior  Outcome: Ongoing  Goal: Verbalizations of feeling emotionally comfortable while being cared for will increase  Description: Verbalizations of feeling emotionally comfortable while being cared for will increase  Outcome: Ongoing     Problem: Psychomotor Activity - Altered:  Goal: Absence of psychomotor disturbance signs and symptoms  Description: Absence of psychomotor disturbance signs and symptoms  Outcome: Ongoing     Problem: Sensory Perception - Impaired:  Goal: Demonstrations of improved sensory functioning will increase  Description: Demonstrations of improved sensory functioning will increase  Outcome: Ongoing  Goal: Decrease in sensory misperception frequency  Description: Decrease in sensory misperception frequency  Outcome: Ongoing  Goal: Able to refrain from responding to false sensory perceptions  Description: Able to refrain from responding to false sensory perceptions  Outcome: Ongoing  Goal: Demonstrates accurate environmental perceptions  Description: Demonstrates accurate environmental perceptions  Outcome: Ongoing  Goal: Able to distinguish between reality-based and nonreality-based thinking  Description: Able to distinguish between reality-based and nonreality-based thinking  Outcome: Ongoing  Goal: Able to interrupt nonreality-based thinking  Description: Able to interrupt nonreality-based thinking  Outcome: Ongoing     Problem: Sleep Pattern Disturbance:  Goal: Appears well-rested  Description: Appears well-rested  Outcome: Ongoing     Problem: Nutrition  Goal: Optimal nutrition therapy  Outcome: Ongoing     Problem: Falls - Risk of:  Goal: Absence of physical injury  Description: Absence of physical injury  Outcome: Ongoing  Goal: Will remain free from falls  Description: Will remain free from falls  Outcome: Ongoing     Problem: Restraint Use - Nonviolent/Non-Self-Destructive Behavior:  Goal: Absence of restraint indications  Description: Absence of restraint indications  Outcome: Ongoing  Goal: Absence of restraint-related injury  Description: Absence of restraint-related injury  Outcome: Ongoing

## 2020-08-13 NOTE — PLAN OF CARE
Problem: Nutrition  Goal: Optimal nutrition therapy  8/13/2020 1351 by Sunil Canada RD, LD  Outcome: Ongoing  8/13/2020 1103 by Kurt Hargrove RN  Outcome: Ongoing  8/13/2020 0044 by Arden Gold RN  Outcome: Ongoing   Nutrition Problem #1: Inadequate oral intake  Intervention: Food and/or Nutrient Delivery: Continue Current Diet, Start Oral Nutrition Supplement  Nutritional Goals: tolerate most appropriate form of nutrition per MD

## 2020-08-13 NOTE — PROGRESS NOTES
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  ONSET DATE: 8/10/2020    Hospital Course: 39 y. o. female with PMHx of seizure disorder, hypothyroidism, hyperlipidemia, hydrocephalus, dysphagia and [place] to CVA, feeding by G-tube, anxiety, MARIA TERESA, and ADHD presented to [facility] emergency department at the request of the nursing facility for a chronically leaking G-tube.  The patient resides in a skilled nursing facility currently secondary to a reddened G-tube site and receiving antibiotics.  Otherwise she resides in a group home.  And she chronically pulls at the G-tube sometimes causing it to be come out completely.  The patient arrives without a G-tube in place.  During the patient's ED course her GI physician Dr. Eva Nunez was consulted.  A discussion took place and the plan is for general surgery to take the patient to surgery tomorrow for a J-tube placement.  The ED did speak with Dr. Espinoza Connor regarding this plan.  The patient has been kept n.p.o.  The patient does not answer questions for me but she does not appear to be in any acute distress.  surgery note:Spoke to lainey Caal. He states that Yin Hollins has been eating well up until recently. Had not used G tube for over a year. Will trial back on diet per speech recs. If she fails to take in adequate calories then will plan perc G-tube.      Recent Chest replacement of PEG tube    Pain:  Pain Assessment  Pain Assessment: FLACC  Pain Level: 1    Reason for Referral  Lan Levin was referred for a bedside swallow evaluation to assess the efficiency of her swallow function, identify signs and symptoms of aspiration and make recommendations regarding safe dietary consistencies, effective compensatory strategies, and safe eating environment. Impression  Dysphagia Diagnosis: Moderate to severe oral stage dysphagia; Moderate pharyngeal stage dysphagia  Dysphagia Impression :   · Pt known to this SLP from previous admission. Pt awake, alert, motivated for PO trials; follows simple dx and able to make basic thoughts known when given extended response time. · Dysphagia appears stable compared to previous admission, characterized by reduced lip rounding for bolus acceptance and labial seal during oral prep, limited lingual ROM and control, poor bolus control and AP transit, delayed swallow initiation, decreased laryngeal elevation. Documented vocal cord paralysis. · Swallow function appears comparable to reported baseline diet of puree and honey thick liquids since most recent MBSS completed at [facility] Feb 2019. · Pt is a total feed and requires extra time to maximize intake at meals. May benefit from 4-5 small meals/snacks per day in order to maximize PO intake. Dysphagia Outcome Severity Scale: Level 3: Moderate dysphagia- Total assisstance, supervision or strategies.  Two or more diet consistencies restricted     Treatment Plan  Requires SLP Intervention: Yes  Duration/Frequency of Treatment: 2-4x while on acute floor  D/C Recommendations: SNF; will need dysphagia tx at SNF for staff ed/carryover of safe feeding techniques     Recommended Diet and Intervention  Diet Solids Recommendation: Dysphagia Pureed (Dysphagia I)  Liquid Consistency Recommendation: Moderately Thick (Honey)  Recommended Form of Meds: Crushed in puree as able  Recommendations: Dysphagia treatment  Therapeutic Interventions: Diet tolerance monitoring;Patient/Family education    Compensatory Swallowing Strategies  Compensatory Swallowing Strategies: Liquid by spoon only;Upright as possible for all oral intake;Remain upright for 30-45 minutes after meals; Total feed    Treatment/Goals  Dysphagia Goals: The patient will tolerate recommended diet without observed clinical signs of aspiration; The patient/caregiver will demonstrate understanding of compensatory strategies for improved swallowing safety. General  Chart Reviewed: Yes  Behavior/Cognition: Alert; Cooperative; Requires cueing;Distractible;Impulsive  Respiratory Status: Room air  Communication Observation: Dysarthria;Aphasia  Follows Directions: Simple(cues)  Dentition: Poor dental/oral hygeine  Patient Positioning: Upright in bed(decreased as test progresses)  Baseline Vocal Quality: Dysphonic  Volitional Cough: Absent  Volitional Swallow: Delayed  Consistencies Administered: Dysphagia Pureed (Dysphagia I); Honey - cup;Honey - teaspoon;Honey - straw     Vision/Hearing  Vision  Vision: Within Functional Limits  Hearing  Hearing: Within functional limits    Oral Motor Deficits  Oral/Motor  Oral Motor: Exceptions to [facility]  Labial Strength: Reduced  Labial Coordination: Reduced  Lingual ROM: Reduced left; Reduced right  Lingual Strength: Reduced  Lingual Coordination: Reduced  Mandible: Impaired    Oral Phase Dysfunction  Oral Phase  Oral Phase: Exceptions  Oral Phase Dysfunction  Impaired Mastication: Puree  Decreased Anterior to Posterior Transit: All  Suspected Premature Bolus Loss: All  Lingual/Palatal Residue: All     Indicators of Pharyngeal Phase Dysfunction   Pharyngeal Phase  Pharyngeal Phase: Exceptions  Indicators of Pharyngeal Phase Dysfunction  Delayed Swallow:  All  Decreased Laryngeal Elevation: All    Prognosis  Prognosis  Prognosis for safe diet advancement: guarded  Barriers to reach goals: severity of dysphagia;time post

## 2020-08-14 ENCOUNTER — APPOINTMENT (OUTPATIENT)
Dept: GENERAL RADIOLOGY | Age: 45
DRG: 919 | End: 2020-08-14
Payer: MEDICARE

## 2020-08-14 LAB
ANION GAP SERPL CALCULATED.3IONS-SCNC: 12 MMOL/L (ref 3–16)
BILIRUBIN URINE: ABNORMAL
BLOOD, URINE: NEGATIVE
BUN BLDV-MCNC: 14 MG/DL (ref 7–20)
CALCIUM SERPL-MCNC: 9.3 MG/DL (ref 8.3–10.6)
CHLORIDE BLD-SCNC: 107 MMOL/L (ref 99–110)
CLARITY: ABNORMAL
CO2: 23 MMOL/L (ref 21–32)
COLOR: ABNORMAL
CREAT SERPL-MCNC: 0.7 MG/DL (ref 0.6–1.1)
EPITHELIAL CELLS, UA: 19 /HPF (ref 0–5)
GFR AFRICAN AMERICAN: >60
GFR NON-AFRICAN AMERICAN: >60
GLUCOSE BLD-MCNC: 93 MG/DL (ref 70–99)
GLUCOSE URINE: NEGATIVE MG/DL
HCT VFR BLD CALC: 46 % (ref 36–48)
HEMOGLOBIN: 15.2 G/DL (ref 12–16)
KETONES, URINE: ABNORMAL MG/DL
LACTIC ACID: 1.8 MMOL/L (ref 0.4–2)
LEUKOCYTE ESTERASE, URINE: ABNORMAL
MCH RBC QN AUTO: 30.8 PG (ref 26–34)
MCHC RBC AUTO-ENTMCNC: 33 G/DL (ref 31–36)
MCV RBC AUTO: 93.4 FL (ref 80–100)
MICROSCOPIC EXAMINATION: YES
NITRITE, URINE: NEGATIVE
PDW BLD-RTO: 13 % (ref 12.4–15.4)
PH UA: 6 (ref 5–8)
PLATELET # BLD: 228 K/UL (ref 135–450)
PMV BLD AUTO: 9 FL (ref 5–10.5)
POTASSIUM REFLEX MAGNESIUM: 3.9 MMOL/L (ref 3.5–5.1)
PROCALCITONIN: 0.05 NG/ML (ref 0–0.15)
PROTEIN UA: ABNORMAL MG/DL
RBC # BLD: 4.93 M/UL (ref 4–5.2)
RBC UA: 6 /HPF (ref 0–4)
SODIUM BLD-SCNC: 142 MMOL/L (ref 136–145)
SPECIFIC GRAVITY UA: >1.03 (ref 1–1.03)
URINE TYPE: ABNORMAL
UROBILINOGEN, URINE: 1 E.U./DL
WBC # BLD: 4.5 K/UL (ref 4–11)
WBC UA: 70 /HPF (ref 0–5)

## 2020-08-14 PROCEDURE — 71045 X-RAY EXAM CHEST 1 VIEW: CPT

## 2020-08-14 PROCEDURE — 85027 COMPLETE CBC AUTOMATED: CPT

## 2020-08-14 PROCEDURE — 6370000000 HC RX 637 (ALT 250 FOR IP): Performed by: INTERNAL MEDICINE

## 2020-08-14 PROCEDURE — C9113 INJ PANTOPRAZOLE SODIUM, VIA: HCPCS | Performed by: SURGERY

## 2020-08-14 PROCEDURE — 2580000003 HC RX 258: Performed by: STUDENT IN AN ORGANIZED HEALTH CARE EDUCATION/TRAINING PROGRAM

## 2020-08-14 PROCEDURE — 87040 BLOOD CULTURE FOR BACTERIA: CPT

## 2020-08-14 PROCEDURE — 94760 N-INVAS EAR/PLS OXIMETRY 1: CPT

## 2020-08-14 PROCEDURE — 87088 URINE BACTERIA CULTURE: CPT

## 2020-08-14 PROCEDURE — 81001 URINALYSIS AUTO W/SCOPE: CPT

## 2020-08-14 PROCEDURE — 92526 ORAL FUNCTION THERAPY: CPT

## 2020-08-14 PROCEDURE — 87086 URINE CULTURE/COLONY COUNT: CPT

## 2020-08-14 PROCEDURE — 6360000002 HC RX W HCPCS: Performed by: INTERNAL MEDICINE

## 2020-08-14 PROCEDURE — 74018 RADEX ABDOMEN 1 VIEW: CPT

## 2020-08-14 PROCEDURE — 80048 BASIC METABOLIC PNL TOTAL CA: CPT

## 2020-08-14 PROCEDURE — 97129 THER IVNTJ 1ST 15 MIN: CPT

## 2020-08-14 PROCEDURE — 1200000000 HC SEMI PRIVATE

## 2020-08-14 PROCEDURE — 84145 PROCALCITONIN (PCT): CPT

## 2020-08-14 PROCEDURE — 6360000002 HC RX W HCPCS: Performed by: SURGERY

## 2020-08-14 PROCEDURE — 2580000003 HC RX 258: Performed by: INTERNAL MEDICINE

## 2020-08-14 PROCEDURE — 6360000002 HC RX W HCPCS: Performed by: STUDENT IN AN ORGANIZED HEALTH CARE EDUCATION/TRAINING PROGRAM

## 2020-08-14 PROCEDURE — 83605 ASSAY OF LACTIC ACID: CPT

## 2020-08-14 PROCEDURE — 36415 COLL VENOUS BLD VENIPUNCTURE: CPT

## 2020-08-14 PROCEDURE — 6370000000 HC RX 637 (ALT 250 FOR IP): Performed by: NURSE PRACTITIONER

## 2020-08-14 PROCEDURE — 87186 SC STD MICRODIL/AGAR DIL: CPT

## 2020-08-14 RX ORDER — ACETAMINOPHEN 325 MG/1
650 TABLET ORAL EVERY 4 HOURS PRN
Status: DISCONTINUED | OUTPATIENT
Start: 2020-08-14 | End: 2020-08-16 | Stop reason: HOSPADM

## 2020-08-14 RX ORDER — MAGNESIUM HYDROXIDE/ALUMINUM HYDROXICE/SIMETHICONE 120; 1200; 1200 MG/30ML; MG/30ML; MG/30ML
30 SUSPENSION ORAL EVERY 6 HOURS PRN
Status: DISCONTINUED | OUTPATIENT
Start: 2020-08-14 | End: 2020-08-16 | Stop reason: HOSPADM

## 2020-08-14 RX ADMIN — ENOXAPARIN SODIUM 40 MG: 40 INJECTION SUBCUTANEOUS at 09:24

## 2020-08-14 RX ADMIN — VANCOMYCIN HYDROCHLORIDE 750 MG: 750 INJECTION, POWDER, LYOPHILIZED, FOR SOLUTION INTRAVENOUS at 14:01

## 2020-08-14 RX ADMIN — SODIUM CHLORIDE, PRESERVATIVE FREE 10 ML: 5 INJECTION INTRAVENOUS at 09:38

## 2020-08-14 RX ADMIN — ALUMINUM HYDROXIDE, MAGNESIUM HYDROXIDE, AND SIMETHICONE 30 ML: 200; 200; 20 SUSPENSION ORAL at 18:47

## 2020-08-14 RX ADMIN — CLOTRIMAZOLE AND BETAMETHASONE DIPROPIONATE: 10; .5 CREAM TOPICAL at 09:17

## 2020-08-14 RX ADMIN — VALPROIC ACID 250 MG: 250 SOLUTION ORAL at 14:20

## 2020-08-14 RX ADMIN — PANTOPRAZOLE SODIUM 40 MG: 40 INJECTION, POWDER, FOR SOLUTION INTRAVENOUS at 09:24

## 2020-08-14 RX ADMIN — CLOTRIMAZOLE AND BETAMETHASONE DIPROPIONATE: 10; .5 CREAM TOPICAL at 22:31

## 2020-08-14 RX ADMIN — LEVOTHYROXINE SODIUM 25 MCG: 0.03 TABLET ORAL at 06:48

## 2020-08-14 RX ADMIN — AMPICILLIN SODIUM AND SULBACTAM SODIUM 1.5 G: 1; .5 INJECTION, POWDER, FOR SOLUTION INTRAMUSCULAR; INTRAVENOUS at 18:30

## 2020-08-14 RX ADMIN — DEXTROAMPHETAMINE SACCHARATE, AMPHETAMINE ASPARTATE, DEXTROAMPHETAMINE SULFATE AND AMPHETAMINE SULFATE 5 MG: 2.5; 2.5; 2.5; 2.5 TABLET ORAL at 12:38

## 2020-08-14 RX ADMIN — ACETAMINOPHEN 650 MG: 325 TABLET ORAL at 11:38

## 2020-08-14 RX ADMIN — AMPICILLIN SODIUM AND SULBACTAM SODIUM 1.5 G: 1; .5 INJECTION, POWDER, FOR SOLUTION INTRAMUSCULAR; INTRAVENOUS at 11:38

## 2020-08-14 RX ADMIN — DEXTROAMPHETAMINE SACCHARATE, AMPHETAMINE ASPARTATE, DEXTROAMPHETAMINE SULFATE AND AMPHETAMINE SULFATE 20 MG: 2.5; 2.5; 2.5; 2.5 TABLET ORAL at 09:24

## 2020-08-14 RX ADMIN — VALPROIC ACID 250 MG: 250 SOLUTION ORAL at 09:30

## 2020-08-14 ASSESSMENT — PAIN SCALES - GENERAL
PAINLEVEL_OUTOF10: 0

## 2020-08-14 NOTE — PLAN OF CARE
Problem: Skin Integrity:  Goal: Will show no infection signs and symptoms  Description: Will show no infection signs and symptoms  Outcome: Ongoing  Goal: Absence of new skin breakdown  Description: Absence of new skin breakdown  Outcome: Ongoing     Problem: Pain:  Goal: Pain level will decrease  Description: Pain level will decrease  Outcome: Ongoing  Goal: Control of acute pain  Description: Control of acute pain  Outcome: Ongoing  Goal: Control of chronic pain  Description: Control of chronic pain  Outcome: Ongoing     Problem: Confusion - Acute:  Goal: Absence of continued neurological deterioration signs and symptoms  Description: Absence of continued neurological deterioration signs and symptoms  Outcome: Ongoing  Goal: Mental status will be restored to baseline  Description: Mental status will be restored to baseline  Outcome: Ongoing     Problem: Discharge Planning:  Goal: Ability to perform activities of daily living will improve  Description: Ability to perform activities of daily living will improve  Outcome: Ongoing  Goal: Participates in care planning  Description: Participates in care planning  Outcome: Ongoing     Problem: Injury - Risk of, Physical Injury:  Goal: Absence of physical injury  Description: Absence of physical injury  Outcome: Ongoing  Goal: Will remain free from falls  Description: Will remain free from falls  Outcome: Ongoing     Problem: Mood - Altered:  Goal: Mood stable  Description: Mood stable  Outcome: Ongoing  Goal: Absence of abusive behavior  Description: Absence of abusive behavior  Outcome: Ongoing  Goal: Verbalizations of feeling emotionally comfortable while being cared for will increase  Description: Verbalizations of feeling emotionally comfortable while being cared for will increase  Outcome: Ongoing     Problem: Psychomotor Activity - Altered:  Goal: Absence of psychomotor disturbance signs and symptoms  Description: Absence of psychomotor disturbance signs and symptoms  Outcome: Ongoing     Problem: Sensory Perception - Impaired:  Goal: Demonstrations of improved sensory functioning will increase  Description: Demonstrations of improved sensory functioning will increase  Outcome: Ongoing  Goal: Decrease in sensory misperception frequency  Description: Decrease in sensory misperception frequency  Outcome: Ongoing  Goal: Able to refrain from responding to false sensory perceptions  Description: Able to refrain from responding to false sensory perceptions  Outcome: Ongoing  Goal: Demonstrates accurate environmental perceptions  Description: Demonstrates accurate environmental perceptions  Outcome: Ongoing  Goal: Able to distinguish between reality-based and nonreality-based thinking  Description: Able to distinguish between reality-based and nonreality-based thinking  Outcome: Ongoing  Goal: Able to interrupt nonreality-based thinking  Description: Able to interrupt nonreality-based thinking  Outcome: Ongoing     Problem: Sleep Pattern Disturbance:  Goal: Appears well-rested  Description: Appears well-rested  Outcome: Ongoing     Problem: Nutrition  Goal: Optimal nutrition therapy  Outcome: Ongoing     Problem: Falls - Risk of:  Goal: Absence of physical injury  Description: Absence of physical injury  Outcome: Ongoing  Goal: Will remain free from falls  Description: Will remain free from falls  Outcome: Ongoing     Problem: Restraint Use - Nonviolent/Non-Self-Destructive Behavior:  Goal: Absence of restraint indications  Description: Absence of restraint indications  Outcome: Ongoing  Goal: Absence of restraint-related injury  Description: Absence of restraint-related injury  Outcome: Ongoing     Problem: Nutrition Deficit:  Goal: Ability to achieve adequate nutritional intake will improve  Description: Ability to achieve adequate nutritional intake will improve  Outcome: Ongoing

## 2020-08-14 NOTE — PROGRESS NOTES
Hospitalist Progress Note      PCP: Tereza Beckham MD    Date of Admission: 8/10/2020    Chief Complaint: Leak G tube site    Hospital Course: 39 y.o. female with PMHx of seizure disorder, hypothyroidism, hyperlipidemia, hydrocephalus, dysphagia and [place] to CVA, feeding by G-tube, anxiety, MARIA TERESA, and ADHD presented to [facility] emergency department at the request of the nursing facility for a chronically leaking G-tube. The patient resides in a skilled nursing facility currently secondary to a reddened G-tube site and receiving antibiotics. Otherwise she resides in a group home. And she chronically pulls at the G-tube sometimes causing it to be come out completely. The patient arrives without a G-tube in place. During the patient's ED course her GI physician Dr. Kota Ramos was consulted. A discussion took place and the plan is for general surgery to take the patient to surgery tomorrow for a J-tube placement. The ED did speak with Dr. Espinoza Connor regarding this plan. The patient has been kept n.p.o. The patient does not answer questions for me but she does not appear to be in any acute distress. Subjective: Patient seen and examined. Patient is more alert and awake today, however patient does not follow commands and is still in restraints. Patient is a spiking new fevers, likely source cellulitis around G-tube site.     Medications:  Reviewed    Infusion Medications   Scheduled Medications    ampicillin-sulbactam  1.5 g Intravenous Q6H    vancomycin (VANCOCIN) intermittent dosing (placeholder)   Other RX Placeholder    vancomycin  750 mg Intravenous Q12H    clotrimazole-betamethasone   Topical BID    pantoprazole  40 mg Intravenous Daily    amphetamine-dextroamphetamine  20 mg Oral QAM    amphetamine-dextroamphetamine  5 mg Oral Lunch    levothyroxine  25 mcg Oral Daily    traZODone  50 mg Oral Nightly    valproic acid  250 mg Oral TID    sodium chloride flush  10 mL Intravenous 2 times per day  enoxaparin  40 mg Subcutaneous Daily     PRN Meds: acetaminophen, sodium chloride flush, ondansetron      Intake/Output Summary (Last 24 hours) at 8/14/2020 1340  Last data filed at 8/14/2020 1015  Gross per 24 hour   Intake 580 ml   Output --   Net 580 ml       Physical Exam Performed:    /75   Pulse 101   Temp 101.2 °F (38.4 °C) (Oral)   Resp 19   Ht 5' 2\" (1.575 m)   Wt 114 lb 10.2 oz (52 kg)   SpO2 92%   BMI 20.97 kg/m²     General appearance: No apparent distress  HEENT: Pupils equal, round, and reactive to light. Conjunctivae/corneas clear. Neck: Supple, with full range of motion. No jugular venous distention. Trachea midline. Respiratory:  Normal respiratory effort. Cardiovascular: Regular rate and rhythm with normal S1/S2   Abdomen: Soft, non-tender, G tube site with leakage, redness around site  Musculoskeletal: No clubbing, cyanosis or edema bilaterally. Skin: Skin color, texture, turgor normal.  No rashes or lesions. Neurologic:  Alert, grossly non-focal.  Psychiatric: Alert   Capillary Refill: Brisk,< 3 seconds   Peripheral Pulses: +2 palpable, equal bilaterally       Labs:   Recent Labs     08/14/20  0644   WBC 4.5   HGB 15.2   HCT 46.0        Recent Labs     08/14/20  0644      K 3.9      CO2 23   BUN 14   CREATININE 0.7   CALCIUM 9.3     No results for input(s): AST, ALT, BILIDIR, BILITOT, ALKPHOS in the last 72 hours. No results for input(s): INR in the last 72 hours. No results for input(s): Karina Grumet in the last 72 hours.     Urinalysis:      Lab Results   Component Value Date    NITRU Negative 08/10/2020    WBCUA 1 07/11/2019    BACTERIA 1+ 07/11/2019    RBCUA 3 07/11/2019    BLOODU Negative 08/10/2020    SPECGRAV 1.019 08/10/2020    GLUCOSEU Negative 08/10/2020       Radiology:  IR FLUORO GUIDED GASTROSTOMY TUBE INSERTION PERC W CONTRAST    (Results Pending)   XR CHEST (2 VW)    (Results Pending)     Assessment/Plan:    Patient is a 49-year-old female with past medical history of dysphagia secondary to CVA, PEG tube who presented to hospital for chronic leaking G-tube. Assessment  Chronic G-tube complications, cellulitis, fever  Hyperlipidemia  Hypothyroidism  ADHD  Seizure disorder    Plan  General surgery has been following, family declined G-tube, continue oral dysphagia diet, continue topical cream  Will initiate vancomycin for new onset fever, likely source cellulitis, order blood cultures, lactic acid  Continue Synthyroid  Continue home aderal, valproic acid  DVT Prophylaxis: lovenox  Diet: DIET DYSPHAGIA PUREED;  Moderately Thick (Honey)  Code Status: Full Code    PT/OT Eval Status: NA    Dispo - Inpt, likely discharge tomorrow on oral antibiotics  Hung Mullen MD

## 2020-08-14 NOTE — PROGRESS NOTES
Patient tolerates about 50% of all meals and 100% of all fluids and snacks offered. She is able to say that she wants to drink or eat. Needs to be fed otherwise not able to eat. Difficult to keep patient is sitting upright position because she constantly sliding down despite educate.   Electronically signed by Diane Causey RN on 8/14/2020 at 1:52 PM
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
## 2020-08-14 NOTE — CARE COORDINATION
DEE spoke with J[place] at [address]. They would like patient to be out of restraints for one day prior to dc.  MD aware    Electronically signed by DINO Sibley on 8/14/2020 at 11:55 AM

## 2020-08-14 NOTE — CARE COORDINATION
Chart reviewed and patient currently in restraints. Spoke with RN who reports patient has been pulling at IV.  VM left with Erlinda Irizarry ([phone]) at [facility] requesting call back to determine if patient needs to be restraint free prior to return    Electronically signed by DINO Melvin on 8/14/2020 at 10:47 AM

## 2020-08-14 NOTE — PROGRESS NOTES
Patient is very restless. Cont with non-violent restr d/t attempts to pull out PIV. Noted removing restraints several times. Redirected with minimum effect. As soon as the patient gets out of restraints, she tries to grab the IV and pull it. Fall prec in place. All needs anticipated. Cont monitoring.  Electronically signed by Jarad Cool RN on 8/14/2020 at 1:49 PM

## 2020-08-15 LAB — VANCOMYCIN TROUGH: 7.9 UG/ML (ref 10–20)

## 2020-08-15 PROCEDURE — 6370000000 HC RX 637 (ALT 250 FOR IP): Performed by: INTERNAL MEDICINE

## 2020-08-15 PROCEDURE — 6360000002 HC RX W HCPCS: Performed by: INTERNAL MEDICINE

## 2020-08-15 PROCEDURE — 6370000000 HC RX 637 (ALT 250 FOR IP): Performed by: NURSE PRACTITIONER

## 2020-08-15 PROCEDURE — 80202 ASSAY OF VANCOMYCIN: CPT

## 2020-08-15 PROCEDURE — 6360000002 HC RX W HCPCS: Performed by: SURGERY

## 2020-08-15 PROCEDURE — 2580000003 HC RX 258: Performed by: STUDENT IN AN ORGANIZED HEALTH CARE EDUCATION/TRAINING PROGRAM

## 2020-08-15 PROCEDURE — 36415 COLL VENOUS BLD VENIPUNCTURE: CPT

## 2020-08-15 PROCEDURE — 2580000003 HC RX 258: Performed by: INTERNAL MEDICINE

## 2020-08-15 PROCEDURE — 1200000000 HC SEMI PRIVATE

## 2020-08-15 PROCEDURE — 6360000002 HC RX W HCPCS: Performed by: STUDENT IN AN ORGANIZED HEALTH CARE EDUCATION/TRAINING PROGRAM

## 2020-08-15 PROCEDURE — C9113 INJ PANTOPRAZOLE SODIUM, VIA: HCPCS | Performed by: SURGERY

## 2020-08-15 RX ADMIN — VALPROIC ACID 250 MG: 250 SOLUTION ORAL at 10:15

## 2020-08-15 RX ADMIN — VALPROIC ACID 250 MG: 250 SOLUTION ORAL at 22:23

## 2020-08-15 RX ADMIN — PANTOPRAZOLE SODIUM 40 MG: 40 INJECTION, POWDER, FOR SOLUTION INTRAVENOUS at 10:14

## 2020-08-15 RX ADMIN — AMPICILLIN SODIUM AND SULBACTAM SODIUM 1.5 G: 1; .5 INJECTION, POWDER, FOR SOLUTION INTRAMUSCULAR; INTRAVENOUS at 13:30

## 2020-08-15 RX ADMIN — ENOXAPARIN SODIUM 40 MG: 40 INJECTION SUBCUTANEOUS at 10:14

## 2020-08-15 RX ADMIN — DEXTROAMPHETAMINE SACCHARATE, AMPHETAMINE ASPARTATE, DEXTROAMPHETAMINE SULFATE AND AMPHETAMINE SULFATE 5 MG: 2.5; 2.5; 2.5; 2.5 TABLET ORAL at 13:30

## 2020-08-15 RX ADMIN — VANCOMYCIN HYDROCHLORIDE 750 MG: 750 INJECTION, POWDER, LYOPHILIZED, FOR SOLUTION INTRAVENOUS at 14:30

## 2020-08-15 RX ADMIN — CLOTRIMAZOLE AND BETAMETHASONE DIPROPIONATE: 10; .5 CREAM TOPICAL at 22:00

## 2020-08-15 RX ADMIN — VALPROIC ACID 250 MG: 250 SOLUTION ORAL at 14:29

## 2020-08-15 RX ADMIN — ACETAMINOPHEN 650 MG: 325 TABLET ORAL at 22:16

## 2020-08-15 RX ADMIN — SODIUM CHLORIDE, PRESERVATIVE FREE 10 ML: 5 INJECTION INTRAVENOUS at 10:18

## 2020-08-15 RX ADMIN — AMPICILLIN SODIUM AND SULBACTAM SODIUM 1.5 G: 1; .5 INJECTION, POWDER, FOR SOLUTION INTRAMUSCULAR; INTRAVENOUS at 18:52

## 2020-08-15 RX ADMIN — AMPICILLIN SODIUM AND SULBACTAM SODIUM 1.5 G: 1; .5 INJECTION, POWDER, FOR SOLUTION INTRAMUSCULAR; INTRAVENOUS at 06:09

## 2020-08-15 RX ADMIN — SODIUM CHLORIDE, PRESERVATIVE FREE 10 ML: 5 INJECTION INTRAVENOUS at 22:00

## 2020-08-15 RX ADMIN — AMPICILLIN SODIUM AND SULBACTAM SODIUM 1.5 G: 1; .5 INJECTION, POWDER, FOR SOLUTION INTRAMUSCULAR; INTRAVENOUS at 01:18

## 2020-08-15 RX ADMIN — VANCOMYCIN HYDROCHLORIDE 750 MG: 750 INJECTION, POWDER, LYOPHILIZED, FOR SOLUTION INTRAVENOUS at 01:19

## 2020-08-15 RX ADMIN — LEVOTHYROXINE SODIUM 25 MCG: 0.03 TABLET ORAL at 06:09

## 2020-08-15 RX ADMIN — DEXTROAMPHETAMINE SACCHARATE, AMPHETAMINE ASPARTATE, DEXTROAMPHETAMINE SULFATE AND AMPHETAMINE SULFATE 20 MG: 2.5; 2.5; 2.5; 2.5 TABLET ORAL at 10:14

## 2020-08-15 RX ADMIN — TRAZODONE HYDROCHLORIDE 50 MG: 50 TABLET ORAL at 22:16

## 2020-08-15 RX ADMIN — CLOTRIMAZOLE AND BETAMETHASONE DIPROPIONATE: 10; .5 CREAM TOPICAL at 10:15

## 2020-08-15 ASSESSMENT — PAIN SCALES - WONG BAKER
WONGBAKER_NUMERICALRESPONSE: 2;4
WONGBAKER_NUMERICALRESPONSE: 0

## 2020-08-15 ASSESSMENT — PAIN SCALES - GENERAL
PAINLEVEL_OUTOF10: 3
PAINLEVEL_OUTOF10: 0
PAINLEVEL_OUTOF10: 0

## 2020-08-15 ASSESSMENT — PAIN DESCRIPTION - PAIN TYPE: TYPE: ACUTE PAIN

## 2020-08-15 NOTE — PROGRESS NOTES
Hospitalist Progress Note      PCP: Radhika Fowler MD    Date of Admission: 8/10/2020    Chief Complaint: Leak G tube site    Hospital Course: 39 y.o. female with PMHx of seizure disorder, hypothyroidism, hyperlipidemia, hydrocephalus, dysphagia and Moro to CVA, feeding by G-tube, anxiety, MARIA TERESA, and ADHD presented to [facility] emergency department at the request of the nursing facility for a chronically leaking G-tube. The patient resides in a skilled nursing facility currently secondary to a reddened G-tube site and receiving antibiotics. Otherwise she resides in a group home. And she chronically pulls at the G-tube sometimes causing it to be come out completely. The patient arrives without a G-tube in place. During the patient's ED course her GI physician Dr. Hollie Montes was consulted. A discussion took place and the plan is for general surgery to take the patient to surgery tomorrow for a J-tube placement. The ED did speak with Dr. Espinoza Connor regarding this plan. The patient has been kept n.p.o. The patient does not answer questions for me but she does not appear to be in any acute distress. Subjective: Patient seen and examined. Patient is alert and awake today, patient did not have any fever episodes again last night.     Medications:  Reviewed    Infusion Medications   Scheduled Medications    ampicillin-sulbactam  1.5 g Intravenous Q6H    vancomycin (VANCOCIN) intermittent dosing (placeholder)   Other RX Placeholder    vancomycin  750 mg Intravenous Q12H    clotrimazole-betamethasone   Topical BID    pantoprazole  40 mg Intravenous Daily    amphetamine-dextroamphetamine  20 mg Oral QAM    amphetamine-dextroamphetamine  5 mg Oral Lunch    levothyroxine  25 mcg Oral Daily    traZODone  50 mg Oral Nightly    valproic acid  250 mg Oral TID    sodium chloride flush  10 mL Intravenous 2 times per day    enoxaparin  40 mg Subcutaneous Daily     PRN Meds: acetaminophen, aluminum & magnesium hydroxide-simethicone, sodium chloride flush, ondansetron      Intake/Output Summary (Last 24 hours) at 8/15/2020 1537  Last data filed at 8/15/2020 0634  Gross per 24 hour   Intake 390 ml   Output --   Net 390 ml       Physical Exam Performed:    /77   Pulse 100   Temp 98.1 °F (36.7 °C) (Oral)   Resp 16   Ht 5' 2\" (1.575 m)   Wt 113 lb 8.6 oz (51.5 kg)   SpO2 94%   BMI 20.77 kg/m²     General appearance: No apparent distress  HEENT: Pupils equal, round, and reactive to light. Conjunctivae/corneas clear. Neck: Supple, with full range of motion. No jugular venous distention. Trachea midline. Respiratory:  Normal respiratory effort. Cardiovascular: Regular rate and rhythm with normal S1/S2   Abdomen: Soft, non-tender, G tube site with leakage, redness around site  Musculoskeletal: No clubbing, cyanosis or edema bilaterally. Skin: Skin color, texture, turgor normal.  No rashes or lesions. Neurologic:  Alert, grossly non-focal.  Psychiatric: Alert   Capillary Refill: Brisk,< 3 seconds   Peripheral Pulses: +2 palpable, equal bilaterally       Labs:   Recent Labs     08/14/20  0644   WBC 4.5   HGB 15.2   HCT 46.0        Recent Labs     08/14/20  0644      K 3.9      CO2 23   BUN 14   CREATININE 0.7   CALCIUM 9.3     No results for input(s): AST, ALT, BILIDIR, BILITOT, ALKPHOS in the last 72 hours. No results for input(s): INR in the last 72 hours. No results for input(s): Micehll [place] in the last 72 hours. Urinalysis:      Lab Results   Component Value Date    NITRU Negative 08/14/2020    WBCUA 70 08/14/2020    BACTERIA 1+ 07/11/2019    RBCUA 6 08/14/2020    BLOODU Negative 08/14/2020    SPECGRAV >1.030 08/14/2020    GLUCOSEU Negative 08/14/2020       Radiology:  XR ABDOMEN (KUB) (SINGLE AP VIEW)   Final Result   Moderate amount of gas is seen throughout nondilated colon. No acute process.          XR CHEST PORTABLE   Final Result   Findings concerning for lingular

## 2020-08-15 NOTE — PLAN OF CARE
Problem: Skin Integrity:  Goal: Will show no infection signs and symptoms  Description: Will show no infection signs and symptoms  Outcome: Ongoing     Problem: Skin Integrity:  Goal: Absence of new skin breakdown  Description: Absence of new skin breakdown  Outcome: Ongoing     Problem: Pain:  Goal: Pain level will decrease  Description: Pain level will decrease  Outcome: Ongoing     Problem: Pain:  Goal: Control of acute pain  Description: Control of acute pain  Outcome: Ongoing     Problem: Pain:  Goal: Control of chronic pain  Description: Control of chronic pain  Outcome: Ongoing     Problem: Confusion - Acute:  Goal: Absence of continued neurological deterioration signs and symptoms  Description: Absence of continued neurological deterioration signs and symptoms  Outcome: Ongoing     Problem: Confusion - Acute:  Goal: Mental status will be restored to baseline  Description: Mental status will be restored to baseline  Outcome: Ongoing     Problem: Discharge Planning:  Goal: Ability to perform activities of daily living will improve  Description: Ability to perform activities of daily living will improve  Outcome: Ongoing     Problem: Discharge Planning:  Goal: Participates in care planning  Description: Participates in care planning  Outcome: Ongoing     Problem: Injury - Risk of, Physical Injury:  Goal: Absence of physical injury  Description: Absence of physical injury  Outcome: Ongoing     Problem: Injury - Risk of, Physical Injury:  Goal: Will remain free from falls  Description: Will remain free from falls  Outcome: Ongoing     Problem: Mood - Altered:  Goal: Mood stable  Description: Mood stable  Outcome: Ongoing     Problem: Mood - Altered:  Goal: Absence of abusive behavior  Description: Absence of abusive behavior  Outcome: Ongoing     Problem: Mood - Altered:  Goal: Verbalizations of feeling emotionally comfortable while being cared for will increase  Description: Verbalizations of feeling

## 2020-08-15 NOTE — PROGRESS NOTES
Clinical Pharmacy Note  Vancomycin Consult    Karishma Ortiz is a 39 y.o. female ordered Vancomycin for Cellulitis/Fever/Sepsis; consult received from Dr. Agusto Mullins to manage therapy. Also receiving Unasyn. 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    Dysphagia    Feeding by G-tube ([facility] [address] [facility]    Hypothyroidism    Hyperlipidemia    Anxiety    Seizure disorder ([facility] [address]    Hydrocephalus ([facility] [address]    ADHD (attention deficit hyperactivity disorder)    Vitamin D deficiency    Cellulitis, abdominal wall    Abdominal wall cellulitis    MARIA TERESA (acute kidney injury) ([facility] [address]    Cellulitis    Feeding tube dysfunction    Open abdominal wall wound       Allergies:  Phenytoin sodium extended     Temp max:  Temp (24hrs), Av °F (36.7 °C), Min:98 °F (36.7 °C), Max:98 °F (36.7 °C)      Recent Labs     20  0644   WBC 4.5       Recent Labs     20  0644   BUN 14   CREATININE 0.7         Intake/Output Summary (Last 24 hours) at 8/15/2020 1415  Last data filed at 8/15/2020 7529  Gross per 24 hour   Intake 870 ml   Output --   Net 870 ml       Culture Results:  Urine and blood cultures ordered --> still in process  Procalcitonin, lactic acid = normal  Afebrile at this time     Ht Readings from Last 1 Encounters:   20 5' 2\" (1.575 m)        Wt Readings from Last 1 Encounters:   08/15/20 113 lb 8.6 oz (51.5 kg)         Estimated Creatinine Clearance: 80 mL/min (based on SCr of 0.7 mg/dL). Assessment/Plan:  Trough prior to 3rd dose 7.9 ug/ml  Continue Vancomycin 750 mg IV every 12 hours ordered. Regimen projects a trough level of 10-15mg/L for cellulitis. Will adjust goal if cultures are positive. Level ordered for 20 1300     Thank you for the consult.    Mariama Jacobs, [address] [facility]

## 2020-08-15 NOTE — PROGRESS NOTES
Pt A&Ox3, pt has difficulty communicating needs. Pt resting in bed, bed alarm in place, call light in reach, nonskid footwear on pt. Restraints removed at  for trial removal.  No concerns noted at this time or need to place restraints on pt. MD notified. Telecam in place. Hourly rounding in place. Will continue to monitor. Bed in lowest position.   Electronically signed by Lesley Cherry RN on 8/15/2020 at 4:44 PM

## 2020-08-16 VITALS
SYSTOLIC BLOOD PRESSURE: 109 MMHG | BODY MASS INDEX: 20.65 KG/M2 | HEIGHT: 62 IN | TEMPERATURE: 97.6 F | OXYGEN SATURATION: 94 % | WEIGHT: 112.21 LBS | RESPIRATION RATE: 22 BRPM | HEART RATE: 100 BPM | DIASTOLIC BLOOD PRESSURE: 74 MMHG

## 2020-08-16 LAB
ANION GAP SERPL CALCULATED.3IONS-SCNC: 11 MMOL/L (ref 3–16)
BUN BLDV-MCNC: 7 MG/DL (ref 7–20)
CALCIUM SERPL-MCNC: 9.1 MG/DL (ref 8.3–10.6)
CHLORIDE BLD-SCNC: 107 MMOL/L (ref 99–110)
CO2: 23 MMOL/L (ref 21–32)
CREAT SERPL-MCNC: 0.7 MG/DL (ref 0.6–1.1)
GFR AFRICAN AMERICAN: >60
GFR NON-AFRICAN AMERICAN: >60
GLUCOSE BLD-MCNC: 109 MG/DL (ref 70–99)
HCT VFR BLD CALC: 43.8 % (ref 36–48)
HEMOGLOBIN: 14.7 G/DL (ref 12–16)
MCH RBC QN AUTO: 31.1 PG (ref 26–34)
MCHC RBC AUTO-ENTMCNC: 33.5 G/DL (ref 31–36)
MCV RBC AUTO: 92.7 FL (ref 80–100)
PDW BLD-RTO: 13.1 % (ref 12.4–15.4)
PLATELET # BLD: 201 K/UL (ref 135–450)
PMV BLD AUTO: 9 FL (ref 5–10.5)
POTASSIUM REFLEX MAGNESIUM: 4 MMOL/L (ref 3.5–5.1)
RBC # BLD: 4.72 M/UL (ref 4–5.2)
SODIUM BLD-SCNC: 141 MMOL/L (ref 136–145)
WBC # BLD: 4.9 K/UL (ref 4–11)

## 2020-08-16 PROCEDURE — 80048 BASIC METABOLIC PNL TOTAL CA: CPT

## 2020-08-16 PROCEDURE — 36415 COLL VENOUS BLD VENIPUNCTURE: CPT

## 2020-08-16 PROCEDURE — 6370000000 HC RX 637 (ALT 250 FOR IP): Performed by: NURSE PRACTITIONER

## 2020-08-16 PROCEDURE — C9113 INJ PANTOPRAZOLE SODIUM, VIA: HCPCS | Performed by: SURGERY

## 2020-08-16 PROCEDURE — 85027 COMPLETE CBC AUTOMATED: CPT

## 2020-08-16 PROCEDURE — 6360000002 HC RX W HCPCS: Performed by: STUDENT IN AN ORGANIZED HEALTH CARE EDUCATION/TRAINING PROGRAM

## 2020-08-16 PROCEDURE — 2580000003 HC RX 258: Performed by: INTERNAL MEDICINE

## 2020-08-16 PROCEDURE — 6360000002 HC RX W HCPCS: Performed by: SURGERY

## 2020-08-16 PROCEDURE — 94760 N-INVAS EAR/PLS OXIMETRY 1: CPT

## 2020-08-16 PROCEDURE — 6360000002 HC RX W HCPCS: Performed by: INTERNAL MEDICINE

## 2020-08-16 PROCEDURE — 2580000003 HC RX 258: Performed by: STUDENT IN AN ORGANIZED HEALTH CARE EDUCATION/TRAINING PROGRAM

## 2020-08-16 PROCEDURE — 6370000000 HC RX 637 (ALT 250 FOR IP): Performed by: INTERNAL MEDICINE

## 2020-08-16 RX ORDER — SULFAMETHOXAZOLE AND TRIMETHOPRIM 200; 40 MG/5ML; MG/5ML
160 SUSPENSION ORAL 2 TIMES DAILY
Qty: 400 ML | Refills: 0 | Status: SHIPPED | OUTPATIENT
Start: 2020-08-16 | End: 2020-08-26

## 2020-08-16 RX ORDER — CLOTRIMAZOLE AND BETAMETHASONE DIPROPIONATE 10; .64 MG/G; MG/G
CREAM TOPICAL
Qty: 1 TUBE | Refills: 1 | Status: SHIPPED | OUTPATIENT
Start: 2020-08-16 | End: 2020-09-16

## 2020-08-16 RX ORDER — AMOXICILLIN AND CLAVULANATE POTASSIUM 250; 62.5 MG/5ML; MG/5ML
25 POWDER, FOR SUSPENSION ORAL 2 TIMES DAILY
Qty: 254 ML | Refills: 0 | Status: SHIPPED | OUTPATIENT
Start: 2020-08-16 | End: 2020-08-26

## 2020-08-16 RX ADMIN — LEVOTHYROXINE SODIUM 25 MCG: 0.03 TABLET ORAL at 06:17

## 2020-08-16 RX ADMIN — PANTOPRAZOLE SODIUM 40 MG: 40 INJECTION, POWDER, FOR SOLUTION INTRAVENOUS at 09:34

## 2020-08-16 RX ADMIN — VALPROIC ACID 250 MG: 250 SOLUTION ORAL at 08:36

## 2020-08-16 RX ADMIN — DEXTROAMPHETAMINE SACCHARATE, AMPHETAMINE ASPARTATE, DEXTROAMPHETAMINE SULFATE AND AMPHETAMINE SULFATE 20 MG: 2.5; 2.5; 2.5; 2.5 TABLET ORAL at 09:34

## 2020-08-16 RX ADMIN — DEXTROAMPHETAMINE SACCHARATE, AMPHETAMINE ASPARTATE, DEXTROAMPHETAMINE SULFATE AND AMPHETAMINE SULFATE 5 MG: 2.5; 2.5; 2.5; 2.5 TABLET ORAL at 13:03

## 2020-08-16 RX ADMIN — AMPICILLIN SODIUM AND SULBACTAM SODIUM 1.5 G: 1; .5 INJECTION, POWDER, FOR SOLUTION INTRAMUSCULAR; INTRAVENOUS at 12:30

## 2020-08-16 RX ADMIN — AMPICILLIN SODIUM AND SULBACTAM SODIUM 1.5 G: 1; .5 INJECTION, POWDER, FOR SOLUTION INTRAMUSCULAR; INTRAVENOUS at 06:17

## 2020-08-16 RX ADMIN — SODIUM CHLORIDE, PRESERVATIVE FREE 10 ML: 5 INJECTION INTRAVENOUS at 09:35

## 2020-08-16 RX ADMIN — VALPROIC ACID 250 MG: 250 SOLUTION ORAL at 14:07

## 2020-08-16 RX ADMIN — CLOTRIMAZOLE AND BETAMETHASONE DIPROPIONATE: 10; .5 CREAM TOPICAL at 09:34

## 2020-08-16 RX ADMIN — VANCOMYCIN HYDROCHLORIDE 750 MG: 750 INJECTION, POWDER, LYOPHILIZED, FOR SOLUTION INTRAVENOUS at 02:30

## 2020-08-16 RX ADMIN — ENOXAPARIN SODIUM 40 MG: 40 INJECTION SUBCUTANEOUS at 09:35

## 2020-08-16 RX ADMIN — AMPICILLIN SODIUM AND SULBACTAM SODIUM 1.5 G: 1; .5 INJECTION, POWDER, FOR SOLUTION INTRAMUSCULAR; INTRAVENOUS at 00:00

## 2020-08-16 RX ADMIN — ALUMINUM HYDROXIDE, MAGNESIUM HYDROXIDE, AND SIMETHICONE 30 ML: 200; 200; 20 SUSPENSION ORAL at 14:11

## 2020-08-16 NOTE — PROGRESS NOTES
PIV removed with no complications.  Electronically signed by Victor Hugo José RN on 8/16/2020 at 4:57 PM

## 2020-08-16 NOTE — PLAN OF CARE
Problem: Skin Integrity:  Goal: Will show no infection signs and symptoms  Description: Will show no infection signs and symptoms  Outcome: Ongoing  Goal: Absence of new skin breakdown  Description: Absence of new skin breakdown  Outcome: Ongoing     Problem: Pain:  Goal: Pain level will decrease  Description: Pain level will decrease  Outcome: Ongoing  Goal: Control of acute pain  Description: Control of acute pain  Outcome: Ongoing  Goal: Control of chronic pain  Description: Control of chronic pain  Outcome: Ongoing     Problem: Confusion - Acute:  Goal: Absence of continued neurological deterioration signs and symptoms  Description: Absence of continued neurological deterioration signs and symptoms  Outcome: Ongoing  Goal: Mental status will be restored to baseline  Description: Mental status will be restored to baseline  Outcome: Ongoing     Problem: Discharge Planning:  Goal: Ability to perform activities of daily living will improve  Description: Ability to perform activities of daily living will improve  Outcome: Ongoing  Goal: Participates in care planning  Description: Participates in care planning  Outcome: Ongoing     Problem: Injury - Risk of, Physical Injury:  Goal: Absence of physical injury  Description: Absence of physical injury  Outcome: Ongoing  Goal: Will remain free from falls  Description: Will remain free from falls  Outcome: Ongoing     Problem: Mood - Altered:  Goal: Mood stable  Description: Mood stable  Outcome: Ongoing  Goal: Absence of abusive behavior  Description: Absence of abusive behavior  Outcome: Ongoing  Goal: Verbalizations of feeling emotionally comfortable while being cared for will increase  Description: Verbalizations of feeling emotionally comfortable while being cared for will increase  Outcome: Ongoing     Problem: Psychomotor Activity - Altered:  Goal: Absence of psychomotor disturbance signs and symptoms  Description: Absence of psychomotor disturbance signs and improve  Description: Ability to achieve adequate nutritional intake will improve  Outcome: Ongoing

## 2020-08-16 NOTE — PROGRESS NOTES
Report given to Sai Nguyen. Denies questions or concerns.  Electronically signed by Cortney Sargent RN on 8/16/2020 at 4:57 PM

## 2020-08-16 NOTE — CARE COORDINATION
SOCIAL WORK DISCHARGE SUMMARY:      DISCHARGE DATE:                 8/16/2020      DISCHARGE PLACE:                [place] [facility] [place]               REPORT NUMBER:       [phone] Ask for RN             FAX NUMBER:                [phone]      TRANSPORTATION:                [facility]  [phone]             TIME:                              430pm      PREFERRED PHARMACY:     At facility      Multiple VMs left with James Officer in admissions at [facility]. Also called building directly to inform of patient's return. Multiple VMs left with Brinda Acuña ([phone]) to inform. SW did speak with him upon admission and he confirmed plan for her to return to [facility] at discharge.    Bedside RN aware    Electronically signed by DINO Meadows on 8/16/2020 at 2:26 PM

## 2020-08-16 NOTE — DISCHARGE SUMMARY
Hospital Medicine Discharge Summary    Patient ID: Jessica Taylor      Patient's PCP: Rikki Koch MD    Admit Date: 8/10/2020     Discharge Date:      Admitting Physician: Yury Lobato DO     Discharge Physician: Merrick Jacobs MD     Discharge Diagnoses: Chronic G-tube complications, cellulitis, fever\  Patient has lingular pneumonia likely aspiration pneumonia  Hyperlipidemia  Hypothyroidism  ADHD  Seizure disorder       Active Hospital Problems    Diagnosis Date Noted    Open abdominal wall wound [S31.109A]     Feeding tube dysfunction [T85.598A] 08/10/2020    Cellulitis, abdominal wall [L03.311] 08/01/2020    Abdominal wall cellulitis [L03.311] 08/01/2020    Feeding by G-tube ([facility] [address] [Z93.1]     Dysphagia [R13.10]     Seizure disorder ([facility] [address] [G40.909]     Hyperlipidemia [E78.5]     Hypothyroidism [E03.9]        The patient was seen and examined on day of discharge and this discharge summary is in conjunction with any daily progress note from day of discharge. Condition at discharge - stable    Hospital Course: 39 y. o. female with PMHx of seizure disorder, hypothyroidism, hyperlipidemia, hydrocephalus, dysphagia and Brice to CVA, feeding by G-tube, anxiety, MARIA TERESA, and ADHD presented to [facility] emergency department at the request of the nursing facility for a chronically leaking G-tube.  The patient resides in a skilled nursing facility currently secondary to a reddened G-tube site and receiving antibiotics.  Otherwise she resides in a group home.  And she chronically pulls at the G-tube sometimes causing it to be come out completely.  The patient arrives without a G-tube in place.  During the patient's ED course her GI physician Dr. Ryan Reyes was consulted.  A discussion took place and the plan is for general surgery to take the patient to surgery tomorrow for a J-tube placement.  The ED did speak with Dr. Espinoza Connor regarding this plan.  The patient has been kept n.p.o.  The patient does not answer questions for me but she does not appear to be in any acute distress. Patient's family member refused  G-tube placement. Surgery signed off. During the hospitalization patient had an episode of fever and chest x-ray did show a lingular pneumonia. Patient started on antibiotics, no further episodes of fever. Patient will be discharged stable condition on antibiotics Bactrim, Augmentin. Patient will be discharged in a stable condition, follow-up instructions given, SIOMARA completed with instructions to have patient upright while feeding/giving medications. .      Exam:     /74   Pulse 100   Temp 97.6 °F (36.4 °C) (Axillary)   Resp 22   Ht 5' 2\" (1.575 m)   Wt 112 lb 3.4 oz (50.9 kg)   SpO2 94%   BMI 20.52 kg/m²     General appearance: No apparent distress  HEENT: Pupils equal, round, and reactive to light. Conjunctivae/corneas clear. Neck: Supple, with full range of motion. No jugular venous distention. Trachea midline. Respiratory:  Normal respiratory effort. Cardiovascular: Regular rate and rhythm with normal S1/S2   Abdomen: Soft, non-tender, G tube site with leakage, redness around site  Musculoskeletal: No clubbing, cyanosis or edema bilaterally. Skin: Skin color, texture, turgor normal.  No rashes or lesions. Neurologic:  Alert, grossly non-focal.  Psychiatric: Alert   Capillary Refill: Brisk,< 3 seconds   Peripheral Pulses: +2 palpable, equal bilaterally     Consults:     IP CONSULT TO GI  IP CONSULT TO GI  IP CONSULT TO GI  IP CONSULT TO HOSPITALIST  IP CONSULT TO GENERAL SURGERY      Code Status:  Full Code    Activity: activity as tolerated    Labs:  For convenience and continuity at follow-up the following most recent labs are provided:      CBC:    Lab Results   Component Value Date    WBC 4.9 08/16/2020    HGB 14.7 08/16/2020    HCT 43.8 08/16/2020     08/16/2020       Renal:    Lab Results   Component Value Date     08/16/2020    K 4.0 08/16/2020     08/16/2020    CO2 23 08/16/2020    BUN 7 08/16/2020    CREATININE 0.7 08/16/2020    CALCIUM 9.1 08/16/2020       Discharge Medications:     Current Discharge Medication List           Details   clotrimazole-betamethasone (LOTRISONE) 1-0.05 % cream Apply topically 2 times daily. Qty: 1 Tube, Refills: 1      amoxicillin-clavulanate (AUGMENTIN) 250-62.5 MG/5ML suspension Take 12.7 mLs by mouth 2 times daily for 10 days  Qty: 254 mL, Refills: 0      sulfamethoxazole-trimethoprim (BACTRIM;SEPTRA) 200-40 MG/5ML suspension Take 20 mLs by mouth 2 times daily for 10 days  Qty: 400 mL, Refills: 0      omeprazole (PRILOSEC) 2 MG/ML SUSP 2 mg/mL oral suspension Take 10 mLs by mouth 2 times daily (before meals)  Qty: 600 mL, Refills: 0              Details   amphetamine-dextroamphetamine (ADDERALL) 20 MG tablet Take 20 mg by mouth every morning. amphetamine-dextroamphetamine (ADDERALL) 5 MG tablet Take 5 mg by mouth Daily with lunch. bisacodyl (DULCOLAX) 10 MG suppository Place 10 mg rectally daily as needed for Constipation      Sodium Phosphates (FLEET) 7-19 GM/118ML Place 1 enema rectally daily as needed      magnesium hydroxide (MILK OF MAGNESIA) 400 MG/5ML suspension Take 30 mLs by mouth daily as needed for Constipation      loratadine (CLARITIN) 10 MG tablet Take 10 mg by mouth daily      traZODone (DESYREL) 50 MG tablet Take 50 mg by mouth nightly      valproic acid (DEPAKENE) 250 MG/5ML SYRP Take 5 mLs by mouth 3 times daily  Qty: 1350 mL, Refills: 0    Comments: **Patient requests 90 days supply**      levothyroxine (SYNTHROID) 25 MCG tablet Take 1 tablet by mouth daily  Qty: 90 tablet, Refills: 0    Comments: **Patient requests 90 days supply**      menthol-zinc oxide (CALMOSEPTINE) 0.44-20.6 % OINT ointment Apply topically 2 times daily as needed Max 30 ml per day.   Qty: 71 g, Refills: 0      polyethylene glycol (GLYCOLAX) powder Take 17 g by mouth daily  Qty: 1 Bottle, Refills: 2    Associated Diagnoses: Feeding by G-tube ([address]      nystatin (MYCOSTATIN) 584749 UNIT/GM cream As directed daily  Qty: 1 Tube, Refills: 1    Associated Diagnoses: Feeding by G-tube ([address]             Time Spent on discharge is more than 30 mints in the examination, evaluation, counseling and review of medications and discharge plan. Signed:    Merrick Jacobs MD   8/16/2020      Thank you Rikki Koch MD for the opportunity to be involved in this patient's care. If you have any questions or concerns please feel free to contact me at [phone].

## 2020-08-16 NOTE — PLAN OF CARE
Problem: Skin Integrity:  Goal: Will show no infection signs and symptoms  Description: Will show no infection signs and symptoms  8/16/2020 0001 by Beatris Branham RN  Outcome: Ongoing  8/15/2020 1708 by Nell Olivo RN  Outcome: Ongoing  Goal: Absence of new skin breakdown  Description: Absence of new skin breakdown  8/16/2020 0001 by Beatris Branham RN  Outcome: Ongoing  8/15/2020 1708 by Nell Olivo RN  Outcome: Ongoing     Problem: Pain:  Description: Pain management should include both nonpharmacologic and pharmacologic interventions.   Goal: Pain level will decrease  Description: Pain level will decrease  8/16/2020 0001 by Beatris Branham RN  Outcome: Ongoing  8/15/2020 1708 by Nell Olivo RN  Outcome: Ongoing  Goal: Control of acute pain  Description: Control of acute pain  8/16/2020 0001 by Beatris Branham RN  Outcome: Ongoing  8/15/2020 1708 by Nell Olivo RN  Outcome: Ongoing  Goal: Control of chronic pain  Description: Control of chronic pain  8/16/2020 0001 by Beatris Branham RN  Outcome: Ongoing  8/15/2020 1708 by Nell Olivo RN  Outcome: Ongoing     Problem: Confusion - Acute:  Goal: Absence of continued neurological deterioration signs and symptoms  Description: Absence of continued neurological deterioration signs and symptoms  8/16/2020 0001 by Beatris Branham RN  Outcome: Ongoing  8/15/2020 1708 by Nell Olivo RN  Outcome: Ongoing  Goal: Mental status will be restored to baseline  Description: Mental status will be restored to baseline  8/16/2020 0001 by Beatris Branham RN  Outcome: Ongoing  8/15/2020 1708 by Nell Olivo RN  Outcome: Ongoing     Problem: Discharge Planning:  Goal: Ability to perform activities of daily living will improve  Description: Ability to perform activities of daily living will improve  8/16/2020 0001 by Beatris Branham RN  Outcome: Ongoing  8/15/2020 1708 by Nell Olivo RN  Outcome: Ongoing  Goal: Participates in care planning  Description: Participates in RN  Outcome: Ongoing  8/15/2020 1708 by Ken Fink RN  Outcome: Ongoing  Goal: Able to refrain from responding to false sensory perceptions  Description: Able to refrain from responding to false sensory perceptions  8/16/2020 0001 by Gato Smith RN  Outcome: Ongoing  8/15/2020 1708 by Ken Fink RN  Outcome: Ongoing  Goal: Demonstrates accurate environmental perceptions  Description: Demonstrates accurate environmental perceptions  8/16/2020 0001 by Gato Smith RN  Outcome: Ongoing  8/15/2020 1708 by Ken Fink RN  Outcome: Ongoing  Goal: Able to distinguish between reality-based and nonreality-based thinking  Description: Able to distinguish between reality-based and nonreality-based thinking  8/16/2020 0001 by Gato Smith RN  Outcome: Ongoing  8/15/2020 1708 by Ken Fink RN  Outcome: Ongoing  Goal: Able to interrupt nonreality-based thinking  Description: Able to interrupt nonreality-based thinking  8/16/2020 0001 by Gato Smith RN  Outcome: Ongoing  8/15/2020 1708 by Ken Fink RN  Outcome: Ongoing     Problem: Sleep Pattern Disturbance:  Goal: Appears well-rested  Description: Appears well-rested  8/16/2020 0001 by Gato Smith RN  Outcome: Ongoing  8/15/2020 1708 by Ken Fink RN  Outcome: Ongoing     Problem: Nutrition  Goal: Optimal nutrition therapy  8/16/2020 0001 by Gato Smith RN  Outcome: Ongoing  8/15/2020 1708 by Ken Fink RN  Outcome: Ongoing     Problem: Falls - Risk of:  Goal: Absence of physical injury  Description: Absence of physical injury  8/16/2020 0001 by Gato Smith RN  Outcome: Ongoing  8/15/2020 1708 by Ken Fink RN  Outcome: Ongoing  Goal: Will remain free from falls  Description: Will remain free from falls  8/16/2020 0001 by Gato Smith RN  Outcome: Ongoing  8/15/2020 1708 by Ken Fink RN  Outcome: Ongoing     Problem: Restraint Use - Nonviolent/Non-Self-Destructive Behavior:  Goal: Absence of restraint indications  Description: Absence of restraint indications  8/16/2020 0001 by Orquidea Figueroa RN  Outcome: Ongoing  8/15/2020 1708 by Addi Terry RN  Outcome: Ongoing  Goal: Absence of restraint-related injury  Description: Absence of restraint-related injury  8/16/2020 0001 by Orquidea Figueroa RN  Outcome: Ongoing  8/15/2020 1708 by Addi Terry RN  Outcome: Ongoing     Problem: Nutrition Deficit:  Goal: Ability to achieve adequate nutritional intake will improve  Description: Ability to achieve adequate nutritional intake will improve  8/16/2020 0001 by Orquidea Figueroa RN  Outcome: Ongoing  8/15/2020 1708 by Addi Terry RN  Outcome: Ongoing

## 2020-08-18 LAB
BLOOD CULTURE, ROUTINE: NORMAL
CULTURE, BLOOD 2: NORMAL
ORGANISM: ABNORMAL
URINE CULTURE, ROUTINE: ABNORMAL
